# Patient Record
Sex: FEMALE | Race: WHITE | Employment: FULL TIME | URBAN - METROPOLITAN AREA
[De-identification: names, ages, dates, MRNs, and addresses within clinical notes are randomized per-mention and may not be internally consistent; named-entity substitution may affect disease eponyms.]

---

## 2019-10-14 ENCOUNTER — HOSPITAL ENCOUNTER (EMERGENCY)
Age: 34
Discharge: HOME | End: 2019-10-15
Attending: EMERGENCY MEDICINE | Admitting: EMERGENCY MEDICINE
Payer: COMMERCIAL

## 2019-10-14 ENCOUNTER — APPOINTMENT (OUTPATIENT)
Dept: CT IMAGING | Age: 34
End: 2019-10-14
Attending: PHYSICIAN ASSISTANT
Payer: COMMERCIAL

## 2019-10-14 DIAGNOSIS — S09.90XA CLOSED HEAD INJURY, INITIAL ENCOUNTER: ICD-10-CM

## 2019-10-14 DIAGNOSIS — R10.9 ABDOMINAL PAIN, UNSPECIFIED ABDOMINAL LOCATION: ICD-10-CM
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
## 2019-10-14 LAB
ALBUMIN SERPL-MCNC: 4.1 G/DL (ref 3.5–5)
ALBUMIN/GLOB SERPL: 1.1 {RATIO} (ref 1.1–2.2)
ALP SERPL-CCNC: 72 U/L (ref 45–117)
ALT SERPL-CCNC: 38 U/L (ref 12–78)
ANION GAP SERPL CALC-SCNC: 10 MMOL/L (ref 5–15)
AST SERPL-CCNC: 13 U/L (ref 15–37)
BASOPHILS # BLD: 0.1 K/UL (ref 0–0.1)
BASOPHILS NFR BLD: 0 % (ref 0–1)
BILIRUB SERPL-MCNC: 0.5 MG/DL (ref 0.2–1)
BUN SERPL-MCNC: 6 MG/DL (ref 6–20)
BUN/CREAT SERPL: 7 (ref 12–20)
CALCIUM SERPL-MCNC: 9.1 MG/DL (ref 8.5–10.1)
CHLORIDE SERPL-SCNC: 109 MMOL/L (ref 97–108)
CO2 SERPL-SCNC: 23 MMOL/L (ref 21–32)
CREAT SERPL-MCNC: 0.88 MG/DL (ref 0.55–1.02)
DIFFERENTIAL METHOD BLD: ABNORMAL
EOSINOPHIL # BLD: 0.1 K/UL (ref 0–0.4)
EOSINOPHIL NFR BLD: 0 % (ref 0–7)
ERYTHROCYTE [DISTWIDTH] IN BLOOD BY AUTOMATED COUNT: 12.6 % (ref 11.5–14.5)
GLOBULIN SER CALC-MCNC: 3.7 G/DL (ref 2–4)
GLUCOSE SERPL-MCNC: 132 MG/DL (ref 65–100)
HCG UR QL: NEGATIVE
HCT VFR BLD AUTO: 44.1 % (ref 35–47)
HGB BLD-MCNC: 14.9 G/DL (ref 11.5–16)
IMM GRANULOCYTES # BLD AUTO: 0.1 K/UL (ref 0–0.04)
IMM GRANULOCYTES NFR BLD AUTO: 1 % (ref 0–0.5)
LIPASE SERPL-CCNC: 285 U/L (ref 73–393)
LYMPHOCYTES # BLD: 2.3 K/UL (ref 0.8–3.5)
LYMPHOCYTES NFR BLD: 13 % (ref 12–49)
MCH RBC QN AUTO: 30.7 PG (ref 26–34)
MCHC RBC AUTO-ENTMCNC: 33.8 G/DL (ref 30–36.5)
MCV RBC AUTO: 90.7 FL (ref 80–99)
MONOCYTES # BLD: 1.1 K/UL (ref 0–1)
MONOCYTES NFR BLD: 6 % (ref 5–13)
NEUTS SEG # BLD: 14 K/UL (ref 1.8–8)
NEUTS SEG NFR BLD: 80 % (ref 32–75)
NRBC # BLD: 0 K/UL (ref 0–0.01)
NRBC BLD-RTO: 0 PER 100 WBC
PLATELET # BLD AUTO: 219 K/UL (ref 150–400)
PMV BLD AUTO: 10.8 FL (ref 8.9–12.9)
POTASSIUM SERPL-SCNC: 3.4 MMOL/L (ref 3.5–5.1)
PROT SERPL-MCNC: 7.8 G/DL (ref 6.4–8.2)
RBC # BLD AUTO: 4.86 M/UL (ref 3.8–5.2)
SODIUM SERPL-SCNC: 142 MMOL/L (ref 136–145)
WBC # BLD AUTO: 17.6 K/UL (ref 3.6–11)

## 2019-10-14 PROCEDURE — 74011636320 HC RX REV CODE- 636/320: Performed by: RADIOLOGY

## 2019-10-14 PROCEDURE — 99284 EMERGENCY DEPT VISIT MOD MDM: CPT

## 2019-10-14 PROCEDURE — 83690 ASSAY OF LIPASE: CPT

## 2019-10-14 PROCEDURE — 85025 COMPLETE CBC W/AUTO DIFF WBC: CPT

## 2019-10-14 PROCEDURE — 80053 COMPREHEN METABOLIC PANEL: CPT

## 2019-10-14 PROCEDURE — 81025 URINE PREGNANCY TEST: CPT

## 2019-10-14 PROCEDURE — 74177 CT ABD & PELVIS W/CONTRAST: CPT

## 2019-10-14 PROCEDURE — 72125 CT NECK SPINE W/O DYE: CPT

## 2019-10-14 PROCEDURE — 70450 CT HEAD/BRAIN W/O DYE: CPT

## 2019-10-14 PROCEDURE — 96360 HYDRATION IV INFUSION INIT: CPT

## 2019-10-14 PROCEDURE — 36415 COLL VENOUS BLD VENIPUNCTURE: CPT

## 2019-10-14 PROCEDURE — 74011000258 HC RX REV CODE- 258: Performed by: RADIOLOGY

## 2019-10-14 RX ORDER — NAPROXEN 500 MG/1
500 TABLET ORAL 2 TIMES DAILY WITH MEALS
Qty: 20 TAB | Refills: 0 | Status: SHIPPED | OUTPATIENT
Start: 2019-10-14 | End: 2019-10-24

## 2019-10-14 RX ORDER — CYCLOBENZAPRINE HCL 10 MG
10 TABLET ORAL
Qty: 10 TAB | Refills: 0 | Status: SHIPPED | OUTPATIENT
Start: 2019-10-14

## 2019-10-14 RX ORDER — SODIUM CHLORIDE 0.9 % (FLUSH) 0.9 %
10 SYRINGE (ML) INJECTION
Status: COMPLETED | OUTPATIENT
Start: 2019-10-14 | End: 2019-10-14

## 2019-10-14 RX ADMIN — SODIUM CHLORIDE 100 ML: 900 INJECTION, SOLUTION INTRAVENOUS at 22:52

## 2019-10-14 RX ADMIN — IOPAMIDOL 100 ML: 755 INJECTION, SOLUTION INTRAVENOUS at 22:52

## 2019-10-14 RX ADMIN — Medication 10 ML: at 22:52

## 2019-10-14 NOTE — LETTER
[address] 
[address] [facility] [address] [id number] 
[phone] Work/School Note Date: 10/14/2019 To Whom It May concern: 
 
Guanakito Means was seen and treated today in the emergency room by the following provider(s): 
Attending Provider: Teddy Gaines MD 
Physician Assistant: AL Pulliam. Guanakito Means may return to work on 10/17/19.  
 
Sincerely, 
 
 
 
 
Imelda Lopez PA-C

## 2019-10-15 VITALS
RESPIRATION RATE: 18 BRPM | SYSTOLIC BLOOD PRESSURE: 130 MMHG | TEMPERATURE: 98 F | OXYGEN SATURATION: 98 % | HEART RATE: 80 BPM | DIASTOLIC BLOOD PRESSURE: 93 MMHG
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
## 2019-10-15 NOTE — ED NOTES
Discharge paperwork given by provider, Ambulated out of the department with a steady gait in no acute distress.

## 2019-10-15 NOTE — ED NOTES
Pt received in sign out pending CT. CT negative. Pt feeling better. Right arm discomfort improving. Patient's results have been reviewed with them. Patient and/or family have verbally conveyed their understanding and agreement of the patient's signs, symptoms, diagnosis, treatment and prognosis and additionally agree to follow up as recommended or return to the Emergency Room should their condition change prior to follow-up. Discharge instructions have also been provided to the patient with some educational information regarding their diagnosis as well a list of reasons why they would want to return to the ER prior to their follow-up appointment should their condition change.
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
## 2021-05-30 ENCOUNTER — APPOINTMENT (OUTPATIENT)
Dept: CT IMAGING | Age: 36
End: 2021-05-30
Attending: PHYSICIAN ASSISTANT
Payer: COMMERCIAL

## 2021-05-30 ENCOUNTER — APPOINTMENT (OUTPATIENT)
Dept: ULTRASOUND IMAGING | Age: 36
End: 2021-05-30
Attending: PHYSICIAN ASSISTANT
Payer: COMMERCIAL

## 2021-05-30 ENCOUNTER — HOSPITAL ENCOUNTER (EMERGENCY)
Age: 36
Discharge: HOME | End: 2021-05-30
Attending: EMERGENCY MEDICINE
Payer: COMMERCIAL

## 2021-05-30 VITALS
HEIGHT: 68 IN | TEMPERATURE: 98.4 F | DIASTOLIC BLOOD PRESSURE: 80 MMHG | RESPIRATION RATE: 16 BRPM | OXYGEN SATURATION: 98 % | HEART RATE: 90 BPM | SYSTOLIC BLOOD PRESSURE: 136 MMHG | BODY MASS INDEX: 37.49 KG/M2 | WEIGHT: 247.36 LBS

## 2021-05-30 DIAGNOSIS — K57.92 DIVERTICULITIS: Primary | ICD-10-CM

## 2021-05-30 LAB
ALBUMIN SERPL-MCNC: 3.8 G/DL (ref 3.5–5)
ALBUMIN/GLOB SERPL: 0.9 {RATIO} (ref 1.1–2.2)
ALP SERPL-CCNC: 86 U/L (ref 45–117)
ALT SERPL-CCNC: 27 U/L (ref 12–78)
ANION GAP SERPL CALC-SCNC: 7 MMOL/L (ref 5–15)
APPEARANCE UR: CLEAR
AST SERPL-CCNC: 9 U/L (ref 15–37)
BACTERIA URNS QL MICRO: ABNORMAL /HPF
BASOPHILS # BLD: 0.1 K/UL (ref 0–0.1)
BASOPHILS NFR BLD: 0 % (ref 0–1)
BILIRUB SERPL-MCNC: 1 MG/DL (ref 0.2–1)
BILIRUB UR QL: NEGATIVE
BUN SERPL-MCNC: 6 MG/DL (ref 6–20)
BUN/CREAT SERPL: 9 (ref 12–20)
CALCIUM SERPL-MCNC: 8.9 MG/DL (ref 8.5–10.1)
CHLORIDE SERPL-SCNC: 106 MMOL/L (ref 97–108)
CO2 SERPL-SCNC: 26 MMOL/L (ref 21–32)
COLOR UR: ABNORMAL
COMMENT, HOLDF: NORMAL
CREAT SERPL-MCNC: 0.68 MG/DL (ref 0.55–1.02)
DIFFERENTIAL METHOD BLD: ABNORMAL
EOSINOPHIL # BLD: 0.6 K/UL (ref 0–0.4)
EOSINOPHIL NFR BLD: 3 % (ref 0–7)
EPITH CASTS URNS QL MICRO: ABNORMAL /LPF
ERYTHROCYTE [DISTWIDTH] IN BLOOD BY AUTOMATED COUNT: 12.9 % (ref 11.5–14.5)
GLOBULIN SER CALC-MCNC: 4.4 G/DL (ref 2–4)
GLUCOSE SERPL-MCNC: 103 MG/DL (ref 65–100)
GLUCOSE UR STRIP.AUTO-MCNC: NEGATIVE MG/DL
HCG UR QL: NEGATIVE
HCT VFR BLD AUTO: 46.1 % (ref 35–47)
HGB BLD-MCNC: 15.3 G/DL (ref 11.5–16)
HGB UR QL STRIP: NEGATIVE
HYALINE CASTS URNS QL MICRO: ABNORMAL /LPF (ref 0–5)
IMM GRANULOCYTES # BLD AUTO: 0.1 K/UL (ref 0–0.04)
IMM GRANULOCYTES NFR BLD AUTO: 1 % (ref 0–0.5)
KETONES UR QL STRIP.AUTO: 15 MG/DL
LACTATE SERPL-SCNC: 1 MMOL/L (ref 0.4–2)
LEUKOCYTE ESTERASE UR QL STRIP.AUTO: NEGATIVE
LYMPHOCYTES # BLD: 1.4 K/UL (ref 0.8–3.5)
LYMPHOCYTES NFR BLD: 6 % (ref 12–49)
MCH RBC QN AUTO: 29.7 PG (ref 26–34)
MCHC RBC AUTO-ENTMCNC: 33.2 G/DL (ref 30–36.5)
MCV RBC AUTO: 89.5 FL (ref 80–99)
MONOCYTES # BLD: 1.3 K/UL (ref 0–1)
MONOCYTES NFR BLD: 6 % (ref 5–13)
NEUTS SEG # BLD: 18.1 K/UL (ref 1.8–8)
NEUTS SEG NFR BLD: 84 % (ref 32–75)
NITRITE UR QL STRIP.AUTO: NEGATIVE
NRBC # BLD: 0 K/UL (ref 0–0.01)
NRBC BLD-RTO: 0 PER 100 WBC
PH UR STRIP: 5.5 [PH] (ref 5–8)
PLATELET # BLD AUTO: 229 K/UL (ref 150–400)
PMV BLD AUTO: 10.9 FL (ref 8.9–12.9)
POTASSIUM SERPL-SCNC: 4 MMOL/L (ref 3.5–5.1)
PROT SERPL-MCNC: 8.2 G/DL (ref 6.4–8.2)
PROT UR STRIP-MCNC: NEGATIVE MG/DL
RBC # BLD AUTO: 5.15 M/UL (ref 3.8–5.2)
RBC #/AREA URNS HPF: ABNORMAL /HPF (ref 0–5)
SAMPLES BEING HELD,HOLD: NORMAL
SODIUM SERPL-SCNC: 139 MMOL/L (ref 136–145)
SP GR UR REFRACTOMETRY: 1.01 (ref 1–1.03)
UR CULT HOLD, URHOLD: NORMAL
UROBILINOGEN UR QL STRIP.AUTO: 0.2 EU/DL (ref 0.2–1)
WBC # BLD AUTO: 21.5 K/UL (ref 3.6–11)
WBC URNS QL MICRO: ABNORMAL /HPF (ref 0–4)

## 2021-05-30 PROCEDURE — 81001 URINALYSIS AUTO W/SCOPE: CPT

## 2021-05-30 PROCEDURE — 87040 BLOOD CULTURE FOR BACTERIA: CPT

## 2021-05-30 PROCEDURE — 85025 COMPLETE CBC W/AUTO DIFF WBC: CPT

## 2021-05-30 PROCEDURE — 81025 URINE PREGNANCY TEST: CPT

## 2021-05-30 PROCEDURE — 74011000636 HC RX REV CODE- 636: Performed by: RADIOLOGY

## 2021-05-30 PROCEDURE — 76830 TRANSVAGINAL US NON-OB: CPT

## 2021-05-30 PROCEDURE — 76856 US EXAM PELVIC COMPLETE: CPT

## 2021-05-30 PROCEDURE — 80053 COMPREHEN METABOLIC PANEL: CPT

## 2021-05-30 PROCEDURE — 99284 EMERGENCY DEPT VISIT MOD MDM: CPT

## 2021-05-30 PROCEDURE — 83605 ASSAY OF LACTIC ACID: CPT

## 2021-05-30 PROCEDURE — 74177 CT ABD & PELVIS W/CONTRAST: CPT

## 2021-05-30 PROCEDURE — 74011250636 HC RX REV CODE- 250/636: Performed by: PHYSICIAN ASSISTANT

## 2021-05-30 PROCEDURE — 96361 HYDRATE IV INFUSION ADD-ON: CPT

## 2021-05-30 PROCEDURE — 74011250637 HC RX REV CODE- 250/637: Performed by: PHYSICIAN ASSISTANT

## 2021-05-30 PROCEDURE — 36415 COLL VENOUS BLD VENIPUNCTURE: CPT

## 2021-05-30 PROCEDURE — 96360 HYDRATION IV INFUSION INIT: CPT

## 2021-05-30 RX ORDER — ACETAMINOPHEN 325 MG/1
650 TABLET ORAL
Status: COMPLETED | OUTPATIENT
Start: 2021-05-30 | End: 2021-05-30

## 2021-05-30 RX ORDER — AMOXICILLIN AND CLAVULANATE POTASSIUM 875; 125 MG/1; MG/1
1 TABLET, FILM COATED ORAL 2 TIMES DAILY
Qty: 20 TABLET | Refills: 0 | Status: SHIPPED | OUTPATIENT
Start: 2021-05-30 | End: 2021-05-30 | Stop reason: SDUPTHER

## 2021-05-30 RX ORDER — AMOXICILLIN AND CLAVULANATE POTASSIUM 875; 125 MG/1; MG/1
1 TABLET, FILM COATED ORAL 2 TIMES DAILY
Qty: 20 TABLET | Refills: 0 | Status: SHIPPED | OUTPATIENT
Start: 2021-05-30 | End: 2021-06-09

## 2021-05-30 RX ADMIN — SODIUM CHLORIDE 1000 ML: 9 INJECTION, SOLUTION INTRAVENOUS at 16:51

## 2021-05-30 RX ADMIN — IOPAMIDOL 100 ML: 755 INJECTION, SOLUTION INTRAVENOUS at 18:35

## 2021-05-30 RX ADMIN — ACETAMINOPHEN 650 MG: 325 TABLET ORAL at 16:51

## 2021-05-30 NOTE — DISCHARGE INSTRUCTIONS
Please treat discomfort with Tylenol/Motrin as we discussed. Please follow up closely with your PCP. Please eat bland foods. Avoid fiber in the acute phase and avoid dairy or spicy foods until you start feeling better, then you can slowly add these back in.

## 2021-05-30 NOTE — ED TRIAGE NOTES
Pt arrives ambulatory to triage for c/o lower abdominal and pelvic pain. Mild fevers, 1 episode vomiting, cramping.  [facility] for further eval.

## 2021-05-30 NOTE — ED PROVIDER NOTES
Tammie Green is a 38 y.o. female without major PMhx who presents to ED with cc of 6/10 lower abdominal pain x 2 days. Has had some nausea with 1 episode of vomiting at initial onset, but no vomiting since. LBM: small, this AM, denies blood. Endorsed chills last night. Endorses some urinary frequency, denies hematuria. Denies vaginal bleeding or abnormal discharge. Is not sexually active, last interaction 1.5 years ago. Has been taking ibuprofen: 2 every 4-6 hrs. Last dose around 9:30 AM.    Juliana Peraza to [facility], referred to ED. Pt denies additional symptoms of cough, congestion, CP, SOB, syncope, visual changes, neck pain, wound. PMHx: Pt denies. PSHx: Appendectomy     PCP: Unknown (Inactive)    There are no other complaints verbalized at this time. History reviewed. No pertinent past medical history. Past Surgical History:   Procedure Laterality Date    HX APPENDECTOMY           History reviewed. No pertinent family history. Social History     Socioeconomic History    Marital status: SINGLE     Spouse name: Not on file    Number of children: Not on file    Years of education: Not on file    Highest education level: Not on file   Occupational History    Not on file   Tobacco Use    Smoking status: Current Every Day Smoker     Packs/day: 0.25    Smokeless tobacco: Never Used   Substance and Sexual Activity    Alcohol use: Not Currently     Comment: occasional    Drug use: No    Sexual activity: Not on file   Other Topics Concern    Not on file   Social History Narrative    Not on file     Social Determinants of Health     Financial Resource Strain:     Difficulty of Paying Living Expenses:    Food Insecurity:     Worried About Running Out of Food in the Last Year:     [address] in the Last Year:    Transportation Needs:     Lack of Transportation (Medical):      Lack of Transportation (Non-Medical):    Physical Activity:     Days of Exercise per Week:     Minutes of Exercise per Session:    Stress:     Feeling of Stress :    Social Connections:     Frequency of Communication with Friends and Family:     Frequency of Social Gatherings with Friends and Family:     Attends [employer] Services:     Active Member of Clubs or Organizations:     Attends Club or Organization Meetings:     Marital Status:    Intimate Partner Violence:     Fear of Current or Ex-Partner:     Emotionally Abused:     Physically Abused:     Sexually Abused: ALLERGIES: Patient has no known allergies. Review of Systems   Constitutional: Positive for chills. Negative for fever. HENT: Negative for congestion. Eyes: Negative for visual disturbance. Respiratory: Negative for cough and shortness of breath. Cardiovascular: Negative for chest pain. Gastrointestinal: Positive for abdominal pain, nausea and vomiting. Negative for constipation and diarrhea. Genitourinary: Positive for frequency. Negative for dysuria, vaginal bleeding and vaginal discharge. Musculoskeletal: Negative for neck pain. Skin: Negative for wound. Neurological: Negative for syncope. All other systems reviewed and are negative. Vitals:    05/30/21 1529 05/30/21 1910 05/30/21 1952   BP: (!) 141/89 112/75 136/80   Pulse: (!) 118 (!) 103 90   Resp: 20 18 16   Temp: (!) 101 °F (38.3 °C) 98.9 °F (37.2 °C) 98.4 °F (36.9 °C)   SpO2: 97% 98% 98%   Weight: 112.2 kg (247 lb 5.7 oz)     Height: 5' 8\" (1.727 m)              Physical Exam  Vitals and nursing note reviewed. Constitutional:       Appearance: Normal appearance. She is not toxic-appearing or diaphoretic. Comments: Febrile: 101 F   HENT:      Head: Atraumatic. Right Ear: External ear normal.      Left Ear: External ear normal.   Eyes:      Conjunctiva/sclera: Conjunctivae normal.   Cardiovascular:      Rate and Rhythm: Regular rhythm. Tachycardia present. Heart sounds: Normal heart sounds. No murmur heard.    No friction rub. No gallop. Pulmonary:      Effort: No respiratory distress. Breath sounds: Normal breath sounds. No stridor. No wheezing, rhonchi or rales. Abdominal:      General: Bowel sounds are normal. There is no distension. Palpations: Abdomen is soft. Tenderness: There is abdominal tenderness in the suprapubic area. There is no right CVA tenderness, left CVA tenderness, guarding or rebound. Hernia: No hernia is present. Musculoskeletal:         General: No swelling or deformity. Cervical back: Normal range of motion. No rigidity. Skin:     General: Skin is warm and dry. Neurological:      Mental Status: She is alert and oriented to person, place, and time. Mental status is at baseline. MDM  Number of Diagnoses or Management Options     Amount and/or Complexity of Data Reviewed  Clinical lab tests: ordered and reviewed  Tests in the radiology section of CPT®: ordered and reviewed  Discuss the patient with other providers: yes (Dr Waylon Dukes, ED attending)           Procedures        7:22 PM  Dr Waylon Dukes, ED attending, in to see and evaluate the patient with me. We have reviewed results with the patient who verbalized her understanding of findings. Continues to be non-toxic appearing, tachycardia improving after 1L fluids. States she does not wish for admission at this time and wishes to go home and treat as outpatient. Notes she does not live alone. We have discussed close return precautions and pathophysiology of diverticulitis and she verbalizes understanding. VITAL SIGNS:  Vitals:    05/30/21 1529 05/30/21 1910 05/30/21 1952   BP: (!) 141/89 112/75 136/80   Pulse: (!) 118 (!) 103 90   Resp: 20 18 16   Temp: (!) 101 °F (38.3 °C) 98.9 °F (37.2 °C) 98.4 °F (36.9 °C)   SpO2: 97% 98% 98%   Weight: 112.2 kg (247 lb 5.7 oz)     Height: 5' 8\" (1.727 m)           LABS:  See attached. IMAGING:  US PELV NON OBS   Final Result   Nonvisualization of the right ovary.  Transvaginal sonography will   be performed to better evaluate. TRANSVAGINAL TECHNIQUE: Transvaginal sonography was performed with multiple   static images of the uterus and ovaries obtained. FINDINGS:    UTERUS:   The uterus is normal. The uterus measures 7.3 x 4.2 x 3.4 cm. .       ENDOMETRIUM:   The endometrial stripe measures 1 mm. Elisha Palmer RIGHT OVARY:   The right ovary is normal. The right ovary measures 3.4 x 3.1 x 2.6 cm. There   is normal color flow to the right ovary. LEFT OVARY:   The left ovary is not visualized because of position. CUL-DE-SAC:   There is free fluid in the cul-de-sac. IMPRESSION: Normal uterus and ovaries. Small amount of free fluid in the   cul-de-sac. US TRANSVAGINAL   Final Result   Nonvisualization of the right ovary. Transvaginal sonography will   be performed to better evaluate. TRANSVAGINAL TECHNIQUE: Transvaginal sonography was performed with multiple   static images of the uterus and ovaries obtained. FINDINGS:    UTERUS:   The uterus is normal. The uterus measures 7.3 x 4.2 x 3.4 cm. .       ENDOMETRIUM:   The endometrial stripe measures 1 mm. Elisha Palmer RIGHT OVARY:   The right ovary is normal. The right ovary measures 3.4 x 3.1 x 2.6 cm. There   is normal color flow to the right ovary. LEFT OVARY:   The left ovary is not visualized because of position. CUL-DE-SAC:   There is free fluid in the cul-de-sac. IMPRESSION: Normal uterus and ovaries. Small amount of free fluid in the   cul-de-sac.             CT ABD PELV W CONT   Final Result   Acute sigmoid diverticulitis            Medications During Visit:  Medications   sodium chloride 0.9 % bolus infusion 1,000 mL (0 mL IntraVENous IV Completed 5/30/21 2003)   acetaminophen (TYLENOL) tablet 650 mg (650 mg Oral Given 5/30/21 1651)   iopamidoL (ISOVUE-370) 76 % injection 100 mL (100 mL IntraVENous Given 5/30/21 1835)         DECISION MAKING:    Dylan Morrell is a 38 y.o. female who comes in as above. Presents with lower abdominal pain over the past two days. Has had some nausea but has not had vomiting since first day. Physical exam demonstrative of a febrile female (101F) with suprapubic abdominal tenderness. UA negative for infection, POC negative for pregnancy. CBC and CMP obtained, significant for WBC 21.5. Given elevated temperature and tachycardia, lactic obtained, noted to be without elevation. Ultrasound demonstrating normal appearing uterus and good blood flow to bilateral ovaries without evidence of TOA. CT abdomen pelvis demonstrating sigmoid diverticulitis. Patient has been treated with Tylenol and 1 L of fluids with improvement of tachycardia and temperature. Upon re-examination, she continues to be well appearing and has had some improvement of discomfort. Attending in to see patient with me during her evaluation and we have discussed results with patient. Patient verbalizes wish for discharge an outpatient treatment of diverticulitis, will send course of Augmentin. We have discussed close return precautions as well as close follow up with primary care or GI. Opportunity for questions presented. Pt verbalizes their understanding and agreement with care plan. IMPRESSION:  1.  Diverticulitis        DISPOSITION:  Discharged      Discharge Medication List as of 5/30/2021  8:02 PM           Follow-up Information     Follow up With Specialties Details Why Contact Info    [address] DE[facility] Emergency Medicine Go to  As needed, If symptoms worsen, if worsening pain or symptoms, no improvement over the next 2-3 days, fever despite antipyretics, new or other concerning symptoms [address] [facility]  Schedule an appointment as soon as possible for a visit   [address] [facility] [address] [phone]    or PCP from the list provided to you  Schedule an appointment as soon as possible for a visit       [facility]  Schedule an appointment as soon as possible for a visit   [address]  [facility] [address] [facility] [address]            The patient is asked to follow-up with their primary care provider and any other physicians as above in the next several days. They are to call tomorrow for an appointment. We have discussed strict return precautions and the patient is asked to return promptly for any increased concerns or worsening of symptoms and for return precautions regarding their symptoms today. They can return to this emergency department or any other emergency department. I have discussed with them results as above and presented opportunity for questions. They verbalize their understanding of the aboveand agreement with care plan. Please note that this dictation was completed with [employer], the computer voice recognition software. Quite often unanticipated grammatical, syntax, homophones, and other interpretive errors are inadvertently transcribed by the computer software. Please disregard these errors. Please excuse any errors that have escaped final proofreading.

## 2021-05-31 NOTE — ED NOTES
MD  reviewed discharge instructions and options with patient. Patient verbalized understanding. RN reviewed discharge instructions using teach back method. Patient ambulatory to exit without difficulty and no acute signs of distress. No complaints or needs expressed at this time. Patient counseled on medications prescribed at discharge. Vital signs stable. Patient to follow up with PCP and GI in the morning for appointment.

## 2021-06-04 LAB
BACTERIA SPEC CULT: NORMAL
SERVICE CMNT-IMP: NORMAL

## 2024-06-26 LAB
ABO, EXTERNAL RESULT: NORMAL
C. TRACHOMATIS, EXTERNAL RESULT: NEGATIVE
HEP B, EXTERNAL RESULT: NEGATIVE
HEPATITIS C ANTIBODY, EXTERNAL RESULT: NON REACTIVE
HIV, EXTERNAL RESULT: NEGATIVE
N. GONORRHOEAE, EXTERNAL RESULT: NEGATIVE
RUBELLA TITER, EXTERNAL RESULT: NORMAL
T. PALLIDUM (SYPHILIS) ANTIBODY, EXTERNAL RESULT: NON REACTIVE

## 2024-11-06 ENCOUNTER — TELEPHONE (OUTPATIENT)
Age: 39
End: 2024-11-06

## 2024-11-06 NOTE — TELEPHONE ENCOUNTER
Spoke with patient and confirmed name and      Scheduled appointment for 24 @ 1:45 [facility] ( provided location)     Advised patient to arrive 10 min early to fill out paperwork.     Informed patient that she may bring two guest over the age of 12.

## 2024-11-07 ENCOUNTER — ROUTINE PRENATAL (OUTPATIENT)
Age: 39
End: 2024-11-07

## 2024-11-07 VITALS
BODY MASS INDEX: 36.53 KG/M2 | DIASTOLIC BLOOD PRESSURE: 84 MMHG | SYSTOLIC BLOOD PRESSURE: 125 MMHG | HEIGHT: 69 IN | HEART RATE: 91 BPM

## 2024-11-07 DIAGNOSIS — Z3A.30 30 WEEKS GESTATION OF PREGNANCY: Primary | ICD-10-CM

## 2024-11-07 PROCEDURE — 99204 OFFICE O/P NEW MOD 45 MIN: CPT | Performed by: STUDENT IN AN ORGANIZED HEALTH CARE EDUCATION/TRAINING PROGRAM

## 2024-11-07 PROCEDURE — 76811 OB US DETAILED SNGL FETUS: CPT | Performed by: STUDENT IN AN ORGANIZED HEALTH CARE EDUCATION/TRAINING PROGRAM

## 2024-11-07 RX ORDER — ASPIRIN 81 MG/1
81 TABLET ORAL DAILY
COMMUNITY

## 2024-11-07 RX ORDER — ESCITALOPRAM OXALATE 10 MG/1
10 TABLET ORAL DAILY
COMMUNITY

## 2024-11-07 NOTE — PROCEDURES
PATIENT: DEB BROWN   -  : 1985   -  DOS:2024   -  INTERPRETING PROVIDER:Asiha Benjamin,   Indication  ========    newly diagnosed GDM    Method  ======    Transabdominal ultrasound examination. View: Suboptimal view: limited by late gestational age    Dating  ======    LMP on: 4/10/2024  Cycle: regular cycle  GA by LMP 30 w + 1 d  CAROLANN by LMP: 1/15/2025  Ultrasound examination on: 2024  GA by U/S based upon: AC, BPD, Femur, HC  GA by U/S 31 w + 3 d  CAROLANN by U/S: 2025  Assigned: based on the LMP, selected on 2024  Assigned GA 30 w + 1 d  Assigned CAROLANN: 1/15/2025    Fetal Growth Overview  =================    Exam date        GA              BPD (mm)        HC (mm)              AC (mm)               FL (mm)             HL (mm)            EFW (g)  2024          30w 1d        79    83%          288.8    62%        271.6    77%         59.1    55%        53.1    73%         1720    74%    Fetal Biometry  ============    Standard  BPD 79.0 mm 31w 5d 83% Hadlock  .2 mm 33w 1d 98% Jermaine  .8 mm 31w 5d 62% Hadlock  Cerebellum tr 36.4 mm 30w 1d 33% Hill  .6 mm 31w 2d 77% Hadlock  Femur 59.1 mm 30w 6d 55% Hadlock  Humerus 53.1 mm 30w 6d 73% Jermaine  EFW 1,720 g 30w 6d 74% Hadlock  EFW (lb) 3 lb  EFW (oz) 13 oz  EFW by: Hadlock (BPD-HC-AC-FL)  Extended   7.0 mm  CM 7.4 mm  60% Nicolaides  Head / Face / Neck  Nasal bone: NOT VISUALIZED  Other Structures   bpm    General Evaluation  ==============    Cardiac activity present.  bpm. Fetal movements: visualized. Presentation: BREECH  Placenta: Placental site: Anterior  Umbilical cord: Cord vessels: 3 vessel cord. Insertion site: central  Amniotic fluid: Amount of AF: normal. MVP 5.3 cm    Fetal Anatomy  ===========    Cranium: normal  Lateral ventricles: normal  Choroid plexus: normal  Midline falx: normal  Cavum septi pellucidi: normal  Cerebellum: normal  Cisterna magna: normal  Head /

## 2024-11-07 NOTE — PROGRESS NOTES
Patient was seen 11/7/2024      Please look under media to view full consult and ultrasound report in ViewPoint.         Aisha Benjamin MD   Maternal Fetal Medicine

## 2024-11-07 NOTE — PROGRESS NOTES
Patient has been newly diagnosed with Gestational Diabetes (GDM). During office visit today, verbal and written teaching were provided to the patient on the following: Gestational Diabetes (GDM), nutrition and carbohydrate choices, how to self-administer finger sticks, blood glucose goals, logging blood sugars, and signs and symptoms of hypoglycemia and hyperglycemia. Patient instructed to check blood sugars 4 times a day: fasting and 1-2 hours postprandial.     Patient to send blood sugar logs weekly on Wednesdays or bring into the office the week of an appointment. [employer] thread was started today for patient to send in log starting next week.     Patient aware that a referral to Diabetes Education was sent and that their office will be in contact to schedule an appointment. Requested patient to keep a food journal to review during appointment. Patient instructed to call our office if they haven't heard from Diabetic Ed within one business week.     Prescriptions sent to patient's preferred pharmacy for glucometer, lancets, and strips. Patient verbalized understanding of the all the above. All questions were answered.

## 2024-11-08 ENCOUNTER — PATIENT MESSAGE (OUTPATIENT)
Age: 39
End: 2024-11-08

## 2024-11-08 DIAGNOSIS — O24.419 GESTATIONAL DIABETES MELLITUS (GDM), ANTEPARTUM, GESTATIONAL DIABETES METHOD OF CONTROL UNSPECIFIED: Primary | ICD-10-CM

## 2024-11-08 RX ORDER — BLOOD SUGAR DIAGNOSTIC
1 STRIP MISCELLANEOUS 4 TIMES DAILY
Qty: 150 EACH | Refills: 0 | Status: SHIPPED | OUTPATIENT
Start: 2024-11-08

## 2024-12-06 ENCOUNTER — HOSPITAL ENCOUNTER (EMERGENCY)
Facility: HOSPITAL | Age: 39
Discharge: HOME | End: 2024-12-06
Attending: OBSTETRICS & GYNECOLOGY
Payer: COMMERCIAL

## 2024-12-06 VITALS
RESPIRATION RATE: 15 BRPM | SYSTOLIC BLOOD PRESSURE: 127 MMHG | TEMPERATURE: 98.6 F | HEART RATE: 93 BPM | DIASTOLIC BLOOD PRESSURE: 89 MMHG

## 2024-12-06 LAB
ALBUMIN SERPL-MCNC: 2.9 G/DL (ref 3.5–5)
ALBUMIN/GLOB SERPL: 0.8 (ref 1.1–2.2)
ALP SERPL-CCNC: 136 U/L (ref 45–117)
ALT SERPL-CCNC: 73 U/L (ref 12–78)
ANION GAP SERPL CALC-SCNC: 9 MMOL/L (ref 2–12)
AST SERPL-CCNC: 32 U/L (ref 15–37)
BILIRUB SERPL-MCNC: 0.5 MG/DL (ref 0.2–1)
BUN SERPL-MCNC: 9 MG/DL (ref 6–20)
BUN/CREAT SERPL: 19 (ref 12–20)
CALCIUM SERPL-MCNC: 9.7 MG/DL (ref 8.5–10.1)
CHLORIDE SERPL-SCNC: 109 MMOL/L (ref 97–108)
CO2 SERPL-SCNC: 18 MMOL/L (ref 21–32)
CREAT SERPL-MCNC: 0.47 MG/DL (ref 0.55–1.02)
CREAT UR-MCNC: 39.7 MG/DL
ERYTHROCYTE [DISTWIDTH] IN BLOOD BY AUTOMATED COUNT: 14.3 % (ref 11.5–14.5)
GLOBULIN SER CALC-MCNC: 3.6 G/DL (ref 2–4)
GLUCOSE SERPL-MCNC: 85 MG/DL (ref 65–100)
HCT VFR BLD AUTO: 39.1 % (ref 35–47)
HGB BLD-MCNC: 13.2 G/DL (ref 11.5–16)
MCH RBC QN AUTO: 29.5 PG (ref 26–34)
MCHC RBC AUTO-ENTMCNC: 33.8 G/DL (ref 30–36.5)
MCV RBC AUTO: 87.5 FL (ref 80–99)
NRBC # BLD: 0 K/UL (ref 0–0.01)
NRBC BLD-RTO: 0 PER 100 WBC
PLATELET # BLD AUTO: 205 K/UL (ref 150–400)
PMV BLD AUTO: 11.3 FL (ref 8.9–12.9)
POTASSIUM SERPL-SCNC: 4.3 MMOL/L (ref 3.5–5.1)
PROT SERPL-MCNC: 6.5 G/DL (ref 6.4–8.2)
PROT UR-MCNC: 16 MG/DL (ref 0–11.9)
PROT/CREAT UR-RTO: 0.4
RBC # BLD AUTO: 4.47 M/UL (ref 3.8–5.2)
SODIUM SERPL-SCNC: 136 MMOL/L (ref 136–145)
WBC # BLD AUTO: 14.7 K/UL (ref 3.6–11)

## 2024-12-06 PROCEDURE — 84156 ASSAY OF PROTEIN URINE: CPT

## 2024-12-06 PROCEDURE — 4500000002 HC ER NO CHARGE

## 2024-12-06 PROCEDURE — 82570 ASSAY OF URINE CREATININE: CPT

## 2024-12-06 PROCEDURE — 99284 EMERGENCY DEPT VISIT MOD MDM: CPT

## 2024-12-06 PROCEDURE — 85027 COMPLETE CBC AUTOMATED: CPT

## 2024-12-06 PROCEDURE — 36415 COLL VENOUS BLD VENIPUNCTURE: CPT

## 2024-12-06 PROCEDURE — 80053 COMPREHEN METABOLIC PANEL: CPT

## 2024-12-06 RX ORDER — NIFEDIPINE 30 MG/1
90 TABLET, EXTENDED RELEASE ORAL DAILY
Status: DISCONTINUED | OUTPATIENT
Start: 2024-12-06 | End: 2024-12-06

## 2024-12-06 NOTE — H&P
History & Physical    Name: Jennifer Nassar MRN: [id number]  SSN: [id number]    YOB: 1985  Age: 38 y.o.  Sex: female      Subjective:     Chief Complaint:  Pregnancy and elevated blood pressures    History of Present Illness: Ms. Nassar is a 38 y.o. G1 at 34w2d  female with an estimated gestational age of 34w2d with Estimated Date of Delivery: 1/15/25.     She was sent to L&D for evaluation for elevated blood pressures.   She first had elevated blood pressures yesterday in the office with /90.   This morning she took her BP which was elevated 150s/90s.   Labs sent yesterday returned : Cr 0.62, AST 31, ALT 52, Hgb 14.2, Plts 220. P/C ratio pending.     She denies headaches, vision changes, RUQ pain or BLE edema.    She has no contractions, LOF, VB, VD, dysuria. She has great FM.      Pregnancy problem:   - Rh neg- s/p rhogam  - AMA, mat 21 neg  - BMI 36  - GDMA1- diet controlled, growth 74%ile      OB History    Para Term  AB Living   1             SAB IAB Ectopic Molar Multiple Live Births                    # Outcome Date GA Lbr Devin/2nd Weight Sex Type Anes PTL Lv   1 Current              No past medical history on file.  Past Surgical History:   Procedure Laterality Date    APPENDECTOMY       Social History     Occupational History    Not on file   Tobacco Use    Smoking status: Every Day     Current packs/day: 0.25     Types: Cigarettes    Smokeless tobacco: Never   Substance and Sexual Activity    Alcohol use: Not Currently    Drug use: No    Sexual activity: Not on file      No family history on file.    No Known Allergies  Prior to Admission medications    Medication Sig Start Date End Date Taking? Authorizing Provider   blood glucose test strips (EXACTECH TEST) strip 1 each by In Vitro route 4 times daily As needed. 24   Suly Everett MD   escitalopram (LEXAPRO) 10 MG tablet Take 1 tablet by mouth daily    Provider, MD Donna   aspirin 81 MG EC tablet  Take 1 tablet by mouth daily    ProviderDonna MD   Prenatal Vit w/Zd-Ibffdulss-UB (PNV PO) Take by mouth    Provider, MD Donna        Review of Systems:  A comprehensive review of systems was negative except for that written in the History of Present Illness.     Objective:     Vitals:    Vitals:    24 1546 24 1554 24 1559 24 1605   BP: 131/85 138/82 138/82 (!) 140/82   Pulse: 90 86 86 93   Resp:   15    Temp:   98.6 °F (37 °C)    TempSrc:   Oral       Temp (24hrs), Av.6 °F (37 °C), Min:98.6 °F (37 °C), Max:98.6 °F (37 °C)    BP  Min: 131/85  Max: 140/82      BP (!) 140/82   Pulse 93   Temp 98.6 °F (37 °C) (Oral)   Resp 15   LMP 04/10/2024     NST:  An NST was performed and was reactive. The baseline FHR was 135, Moderate baseline  variability was noted. Accelerations of sufficient amplitude and duration were noted.  There were no decelerations noted.    Gen: alert and oriented X3   Resp:nonlabored respirations  Cardiac: normal peripheral perfusion  Abdomen: soft, nontender, nondistended. Gravid  Ext: no pitting edema  Cephalic presentation on US    Cat 1 fetal tracing  No contractions    Assessment and Plan:       39yo G1 at 34w2d presents to L&D for PIH evaluation.     PIH: elevated BP yesterday and again today, only slightly mild range  - Dx GHTN, will at the minimum proceed with weekly ANS, weekly labs and strict BP monitoring  - PIH labs yesterday: Cr 0.62, AST 31, ALT 52, Hgb 14.2, Plts 220. P/C ratio pending.   - Serial Bps, Repeat PIH labs  - Hold on BMZ given GDM    Other pregnancy problem:   - Rh neg- s/p rhogam  - AMA, mat 21 neg  - BMI 36  - GDMA1- diet controlled, growth 74%ile  - FOB not involved    Dispo: Labs, serial BP monitoring. If severe PreE, will proceed with induction. If severe features ruled out, will see in office Monday/Tuesday with BPP.     Natalie Zavala MD      5:07 PM  Cephalic presentation.   Reviewed options with patient and all

## 2024-12-06 NOTE — PROGRESS NOTES
1540 Pt presents to [facility] with c/o htn this AM. Pt denies being on any antihypertensives. Occ HA but denies blurry vision, swelling, or epigastric pain.    1600 Dr Zavala at bedside. Labs ordered.    1700 Dr Zavala at bedside. Discussing POC with pt. Plan for either DC Home if labs are normal or IOL if severe features present. Vscan done, cephalic. Signout given to Dr Travis.    1815 All labs and BP reviewed with Dr Travis. Orders given to DC pt home. Pt given BP cuff for home monitoring.

## 2024-12-06 NOTE — DISCHARGE INSTRUCTIONS
than your doctor told you it should be, or it rises quickly.     You have any vaginal bleeding.     You have new nausea or vomiting.     You think that you are in labor.     You have pain in your belly or pelvis.     You gain weight rapidly.   Follow-up care is a key part of your treatment and safety. Be sure to make and go to all appointments, and call your doctor if you are having problems. It's also a good idea to know your test results and keep a list of the medicines you take.  Where can you learn more?  Go to https://www.Rewind Me.net/patientEd and enter Z954 to learn more about \"Preeclampsia: Care Instructions.\"  Current as of: July 10, 2023  Content Version: 14.2  © 2024 [employer].   Care instructions adapted under license by [employer]. If you have questions about a medical condition or this instruction, always ask your healthcare professional. Healthwise[employer] Incorporated disclaims any warranty or liability for your use of this information.         Weeks 32 to 34 of Your Pregnancy: Care Instructions    Decide whether you want to bank or donate your baby's umbilical cord blood. If you want to save this blood, you have to arrange for it ahead of time.   Decide about circumcision. Personal, Worship, or cultural beliefs may play a role in your decision. You get to decide what you want for your baby.         Learn how to ease hemorrhoids.   Get more liquids, fruits, vegetables, and fiber in your diet.  Avoid sitting for too long.  Clean yourself with moist toilet paper. Or try witch hazel pads.  Try ice packs or warm sitz baths for discomfort.  Use hydrocortisone cream for pain or itching.  Ask your doctor about stool softeners.        Consider the benefits of breastfeeding.   It reduces your baby's risk of sudden infant death syndrome (SIDS).   babies are less likely to get certain infections. And they're less likely to be obese or get diabetes later in life.  It can lower your risk of  breast and ovarian cancers and osteoporosis.  It saves you money.  Follow-up care is a key part of your treatment and safety. Be sure to make and go to all appointments, and call your doctor if you are having problems. It's also a good idea to know your test results and keep a list of the medicines you take.  Where can you learn more?  Go to https://www.[employer].net/patientEd and enter X711 to learn more about \"Weeks 32 to 34 of Your Pregnancy: Care Instructions.\"  Current as of: July 10, 2023  Content Version: 14.2  © 2024 [employer].   Care instructions adapted under license by [employer]. If you have questions about a medical condition or this instruction, always ask your healthcare professional. Healthwise, Incorporated disclaims any warranty or liability for your use of this information.

## 2024-12-09 ENCOUNTER — HOSPITAL ENCOUNTER (EMERGENCY)
Facility: HOSPITAL | Age: 39
Discharge: HOME | End: 2024-12-09
Payer: COMMERCIAL

## 2024-12-09 VITALS
SYSTOLIC BLOOD PRESSURE: 143 MMHG | TEMPERATURE: 97.8 F | RESPIRATION RATE: 14 BRPM | DIASTOLIC BLOOD PRESSURE: 88 MMHG | HEART RATE: 92 BPM | OXYGEN SATURATION: 98 %

## 2024-12-09 LAB
ALBUMIN SERPL-MCNC: 2.6 G/DL (ref 3.5–5)
ALBUMIN/GLOB SERPL: 0.7 (ref 1.1–2.2)
ALP SERPL-CCNC: 125 U/L (ref 45–117)
ALT SERPL-CCNC: 73 U/L (ref 12–78)
ANION GAP SERPL CALC-SCNC: 10 MMOL/L (ref 2–12)
AST SERPL-CCNC: 35 U/L (ref 15–37)
BILIRUB SERPL-MCNC: 0.4 MG/DL (ref 0.2–1)
BUN SERPL-MCNC: 8 MG/DL (ref 6–20)
BUN/CREAT SERPL: 17 (ref 12–20)
CALCIUM SERPL-MCNC: 8.9 MG/DL (ref 8.5–10.1)
CHLORIDE SERPL-SCNC: 110 MMOL/L (ref 97–108)
CO2 SERPL-SCNC: 16 MMOL/L (ref 21–32)
CREAT SERPL-MCNC: 0.48 MG/DL (ref 0.55–1.02)
CREAT UR-MCNC: 28.8 MG/DL
ERYTHROCYTE [DISTWIDTH] IN BLOOD BY AUTOMATED COUNT: 14.3 % (ref 11.5–14.5)
GLOBULIN SER CALC-MCNC: 3.7 G/DL (ref 2–4)
GLUCOSE SERPL-MCNC: 99 MG/DL (ref 65–100)
HCT VFR BLD AUTO: 37.1 % (ref 35–47)
HGB BLD-MCNC: 12.8 G/DL (ref 11.5–16)
MCH RBC QN AUTO: 30.2 PG (ref 26–34)
MCHC RBC AUTO-ENTMCNC: 34.5 G/DL (ref 30–36.5)
MCV RBC AUTO: 87.5 FL (ref 80–99)
NRBC # BLD: 0 K/UL (ref 0–0.01)
NRBC BLD-RTO: 0 PER 100 WBC
PLATELET # BLD AUTO: 197 K/UL (ref 150–400)
PMV BLD AUTO: 11 FL (ref 8.9–12.9)
POTASSIUM SERPL-SCNC: 4.3 MMOL/L (ref 3.5–5.1)
PROT SERPL-MCNC: 6.3 G/DL (ref 6.4–8.2)
PROT UR-MCNC: 13 MG/DL (ref 0–11.9)
PROT/CREAT UR-RTO: 0.5
RBC # BLD AUTO: 4.24 M/UL (ref 3.8–5.2)
SODIUM SERPL-SCNC: 136 MMOL/L (ref 136–145)
WBC # BLD AUTO: 14.4 K/UL (ref 3.6–11)

## 2024-12-09 PROCEDURE — 82570 ASSAY OF URINE CREATININE: CPT

## 2024-12-09 PROCEDURE — 36415 COLL VENOUS BLD VENIPUNCTURE: CPT

## 2024-12-09 PROCEDURE — 4500000002 HC ER NO CHARGE

## 2024-12-09 PROCEDURE — 85027 COMPLETE CBC AUTOMATED: CPT

## 2024-12-09 PROCEDURE — 99283 EMERGENCY DEPT VISIT LOW MDM: CPT

## 2024-12-09 PROCEDURE — 80053 COMPREHEN METABOLIC PANEL: CPT

## 2024-12-09 PROCEDURE — 84156 ASSAY OF PROTEIN URINE: CPT

## 2024-12-09 NOTE — ED NOTES
Pt is a 39 yo  @ 34w5d who presents for repeat blood work to evaluate for severe pre-e.    She was seen on  and had the following labs from the office:  Cr 0.62, AST 31, ALT 52, Hgb 14.2, Plts 220.     She was sent to L&D on  for evaluation for elevated blood pressures and labs then were as follows  Cr 0.47, AST 32, ALT 73    Because the AST increased from 52 to 73 she was sent to L&D for repeat lab work.     Of note both values were in reference range as normal for the specific lab     She denies headaches, vision changes, RUQ pain or BLE edema.    She has no contractions, LOF, VB, VD, dysuria. She has great FM.       Pregnancy problem:   - Rh neg- s/p rhogam  - AMA, mat 21 neg  - BMI 36  - GDMA1- diet controlled, growth 74%ile      The history is provided by the patient.   Hypertension         Chief Complaint   Patient presents with    Hypertension    Follow-up       Past Medical History:   Diagnosis Date    Gestational diabetes     diet-controlled    Pre-eclampsia        Past Surgical History:   Procedure Laterality Date    APPENDECTOMY           No family history on file.    Social History     Socioeconomic History    Marital status: Single     Spouse name: Not on file    Number of children: Not on file    Years of education: Not on file    Highest education level: Not on file   Occupational History    Not on file   Tobacco Use    Smoking status: Every Day     Current packs/day: 0.25     Types: Cigarettes    Smokeless tobacco: Never   Substance and Sexual Activity    Alcohol use: Not Currently    Drug use: No    Sexual activity: Not on file   Other Topics Concern    Not on file   Social History Narrative    Not on file     Social Determinants of Health     Financial Resource Strain: Not on file   Food Insecurity: Not on file   Transportation Needs: Not on file   Physical Activity: Not on file   Stress: Not on file   Social Connections: Not on file   Intimate Partner Violence: Not on file   Housing

## 2024-12-09 NOTE — ED TRIAGE NOTES
12/9/2024 10:56 AM: Pt arrives to [facility] for PIH workup. Pt denies headache, blurry vision, or RUQ pain. Denies significant changes in edema or n/v. Pt endorses (+)FM, denies ctx, LOF, or VB. MD Holt notified of pt arrival     1220: MD Holt updated on lab results and pt status    1248: MD Holt at bs to see pt. Plan to d/c home     1305: Discharge instructions and After Visit Summary provided to pt. All of pt's questions answered; pt verbalizes understanding and has no further questions. Pt ambulatory off unit to discharge home.

## 2024-12-09 NOTE — PROGRESS NOTES
12:48 PM  Talked with MFM Dr. Benjamin about Pre E and lab values.   ALT unchanged from 12/6, others stable  P/c 0.4-->0.5.   Per Dr. Benjamin, cannot use other lab reference ranges to determine SF.   Pt asymptomatic with mild range Bps.   Dr. Benjamin recommends discharge home with initiation of twice weekly ANS.   Will follow up with myself tomorrow with NST and repeat labs, and MFM on Friday (4 days).   This was all relayed to OB Hospitalist, Dr. Holt, who has graciously been assisting with this patients care today.     Natalie Zavala MD

## 2024-12-13 ENCOUNTER — ROUTINE PRENATAL (OUTPATIENT)
Age: 39
End: 2024-12-13

## 2024-12-13 VITALS — DIASTOLIC BLOOD PRESSURE: 87 MMHG | SYSTOLIC BLOOD PRESSURE: 128 MMHG | HEART RATE: 108 BPM

## 2024-12-13 DIAGNOSIS — O24.410 DIET CONTROLLED WHITE CLASSIFICATION A1 GESTATIONAL DIABETES MELLITUS (GDM): ICD-10-CM

## 2024-12-13 DIAGNOSIS — Z3A.35 35 WEEKS GESTATION OF PREGNANCY: Primary | ICD-10-CM

## 2024-12-13 NOTE — PROCEDURES
PATIENT: DEB BROWN   -  : 1985   -  DOS:2024   -  INTERPRETING PROVIDER:Truong Miguel,   Indication  ========    Advanced Maternal Age, Gestational diabetes, Obesity in pregnancy    Method  ======    Transabdominal ultrasound examination. View: Suboptimal view: limited by maternal body habitus and fetal position    Pregnancy  =========    Poole pregnancy. Number of fetuses: 1    Dating  ======    LMP on: 4/10/2024  Cycle: regular cycle  GA by LMP 35 w + 2 d  CAROLANN by LMP: 1/15/2025  Ultrasound examination on: 2024  GA by U/S based upon: AC, BPD, Femur, HC  GA by U/S 37 w + 2 d  CAROLANN by U/S: 2025  Assigned: based on the LMP, selected on 2024  Assigned GA 35 w + 2 d  Assigned CAROLANN: 1/15/2025    Fetal Biometry  ============    Standard  BPD 95.9 mm 39w 1d >99% Hadlock  .1 mm -/- >99% Jermaine  .5 mm 40w 1d 99% Hadlock  .1 mm 34w 3d 33% Hadlock  Femur 68.3 mm 35w 1d 38% Hadlock  EFW 2,739 g 35w 5d 59% Hadlock  EFW (lb) 6 lb  EFW (oz) 1 oz  EFW by: Hadlock (BPD-HC-AC-FL)  Other Structures   bpm    General Evaluation  ==============    Cardiac activity present.  bpm. Fetal movements: visualized. Presentation: Cephalic  Placenta: Placental site: Anterior  Umbilical cord: Cord vessels: 3 vessel cord. Insertion site: central  Amniotic fluid: Amount of AF: normal. MVP 7.2 cm. ESTRELLA 19.4 cm. Q1 5.3 cm, Q2 7.2 cm, Q3 1.5 cm, Q4 5.5 cm    Fetal Anatomy  ===========    Profile: SUBOPTIMAL  Face  Palate: SUBOPTIMAL  Maxilla: SUBOPTIMAL  Mandible: SUBOPTIMAL  RVOT view: NOT VISUALIZED  3-vessel view: NOT VISUALIZED  3-vessel-trachea view: NOT VISUALIZED  Heart / Thorax  Aortic arch view: NOT VISUALIZED  Bicaval view: NOT VISUALIZED  Cord insertion: NOT VISUALIZED  Stomach: normal  Kidneys: normal  Bladder: normal  Cervical spine: SUBOPTIMAL  Thoracic spine: SUBOPTIMAL  Lumbar spine: SUBOPTIMAL  Sacral spine: SUBOPTIMAL  Rt fingers: SUBOPTIMAL  Lt upper

## 2024-12-16 ENCOUNTER — ROUTINE PRENATAL (OUTPATIENT)
Age: 39
End: 2024-12-16
Payer: COMMERCIAL

## 2024-12-16 VITALS — HEART RATE: 86 BPM | DIASTOLIC BLOOD PRESSURE: 83 MMHG | SYSTOLIC BLOOD PRESSURE: 135 MMHG

## 2024-12-16 DIAGNOSIS — Z3A.35 35 WEEKS GESTATION OF PREGNANCY: Primary | ICD-10-CM

## 2024-12-16 PROCEDURE — 99214 OFFICE O/P EST MOD 30 MIN: CPT | Performed by: STUDENT IN AN ORGANIZED HEALTH CARE EDUCATION/TRAINING PROGRAM

## 2024-12-16 PROCEDURE — 76819 FETAL BIOPHYS PROFIL W/O NST: CPT | Performed by: STUDENT IN AN ORGANIZED HEALTH CARE EDUCATION/TRAINING PROGRAM

## 2024-12-16 NOTE — PROGRESS NOTES
Please look under media to view full consult and ultrasound report in ViewPoint.     Jennifer Nassar , was evaluated through a synchronous (real-time) audio-video encounter.     The patient (or guardian if applicable) is aware that this is a billable service, which includes applicable co-pays. This Virtual Visit was conducted with patient's (and/or legal guardian's) consent. Patient identification was verified, and a caregiver was present when appropriate.     This patient was located at home facility [facility] [address] [place] [address].     Provider was located at facility [facility] [address] [place] [address] [address] [place] [address].     Confirm you are appropriately licensed, registered, or certified to deliver care in the state where the patient is located as indicated above. If you are not or unsure, please re-schedule the visit: Yes, I confirm.    Aisha Benjamin MD   Maternal Fetal Medicine

## 2024-12-16 NOTE — PROCEDURES
PATIENT: DEB BROWN   -  : 1985   -  DOS:2024   -  INTERPRETING PROVIDER:Aisha Benjamin,   Indication  ========    Advanced Maternal Age, Gestational diabetes, Obesity in pregnancy    Method  ======    Transabdominal ultrasound examination. View: Suboptimal view: limited by fetal position. Suboptimal view: limited by late gestational age    Pregnancy  =========    Poole pregnancy. Number of fetuses: 1    Dating  ======    LMP on: 4/10/2024  Cycle: regular cycle  GA by LMP 35 w + 5 d  CAROLANN by LMP: 1/15/2025  Assigned: based on the LMP, selected on 2024  Assigned GA 35 w + 5 d  Assigned CAROLANN: 1/15/2025    General Evaluation  ==============    Cardiac activity present.  bpm. Fetal movements: visualized. Presentation: Cephalic  Placenta: Placental site: Anterior  Umbilical cord: Cord vessels: 3 vessel cord    Fetal Anatomy  ===========    Profile: SUBOPTIMAL  Face  Palate: SUBOPTIMAL  Maxilla: SUBOPTIMAL  Mandible: SUBOPTIMAL  RVOT view: NOT VISUALIZED  3-vessel view: NOT VISUALIZED  3-vessel-trachea view: NOT VISUALIZED  Heart / Thorax  Aortic arch view: NOT VISUALIZED  Bicaval view: NOT VISUALIZED  Cord insertion: NOT VISUALIZED  Stomach: normal  Kidneys: normal  Bladder: normal  Cervical spine: SUBOPTIMAL  Thoracic spine: SUBOPTIMAL  Lumbar spine: SUBOPTIMAL  Sacral spine: SUBOPTIMAL  Rt fingers: SUBOPTIMAL  Lt upper arm: suboptimal  Lt hand: SUBOPTIMAL  Lt fingers: SUBOPTIMAL  Wants to know fetal sex: yes    Amniotic Fluid Assessment  =====================    Amount of AF: normal  MVP 5.3 cm. ESTRELLA 13.9 cm. Q1 3.8 cm, Q2 2.5 cm, Q3 2.3 cm, Q4 5.3 cm    Biophysical Profile  ==============    2: Fetal breathing movements  2: Gross body movements  2: Fetal tone  2: Amniotic fluid volume   Biophysical profile score    Findings  =======    Intrauterine Poole pregnancy at 35w 5d by clinical dates.  Amniotic fluid: normal.  Placenta is Anterior.  Biophysical profile score is

## 2024-12-18 LAB — GBS, EXTERNAL RESULT: POSITIVE

## 2024-12-20 ENCOUNTER — ROUTINE PRENATAL (OUTPATIENT)
Age: 39
End: 2024-12-20

## 2024-12-20 ENCOUNTER — ROUTINE PRENATAL (OUTPATIENT)
Age: 39
End: 2024-12-20
Payer: COMMERCIAL

## 2024-12-20 VITALS — SYSTOLIC BLOOD PRESSURE: 138 MMHG | HEART RATE: 81 BPM | DIASTOLIC BLOOD PRESSURE: 77 MMHG

## 2024-12-20 DIAGNOSIS — K57.90 DIVERTICULOSIS: ICD-10-CM

## 2024-12-20 DIAGNOSIS — O24.410 DIET CONTROLLED WHITE CLASSIFICATION A1 GESTATIONAL DIABETES MELLITUS (GDM): Primary | ICD-10-CM

## 2024-12-20 DIAGNOSIS — O24.410 DIET CONTROLLED WHITE CLASSIFICATION A1 GESTATIONAL DIABETES MELLITUS (GDM): ICD-10-CM

## 2024-12-20 DIAGNOSIS — O09.523 MULTIGRAVIDA OF ADVANCED MATERNAL AGE IN THIRD TRIMESTER: ICD-10-CM

## 2024-12-20 DIAGNOSIS — Z3A.36 36 WEEKS GESTATION OF PREGNANCY: Primary | ICD-10-CM

## 2024-12-20 DIAGNOSIS — F41.9 ANXIETY: ICD-10-CM

## 2024-12-20 DIAGNOSIS — O14.93 PREECLAMPSIA, THIRD TRIMESTER: ICD-10-CM

## 2024-12-20 PROCEDURE — 76815 OB US LIMITED FETUS(S): CPT | Performed by: STUDENT IN AN ORGANIZED HEALTH CARE EDUCATION/TRAINING PROGRAM

## 2024-12-20 PROCEDURE — 59025 FETAL NON-STRESS TEST: CPT | Performed by: STUDENT IN AN ORGANIZED HEALTH CARE EDUCATION/TRAINING PROGRAM

## 2024-12-20 NOTE — PROGRESS NOTES
Assessment & Plan   ASSESSMENT/PLAN:  1. Diet controlled White classification A1 gestational diabetes mellitus (GDM)  2. Diverticulosis  3. Anxiety  4. Multigravida of advanced maternal age in third trimester  5. Preeclampsia, third trimester    JENNIFER is 38 yrs of age,  seen for a pregnancy complicated by:     Reassuring modified BPP and normal ESTRELLA today.      Gestational Diabetes:   - Prev counseled   - Recommend serial growth scans from diagnosis   - Weekly ANT starting at 36 weeks but if insulin started then commence at 32 weeks   - Delivery 37 weeks due to preeclampsia  - Log reviewed: Overall controlled with diet.   - Continue diet control management      Diverticulosis  - stable, no meds      Anxiety   - Lexapro 10 mg daily   - Mood stable      Obesity Class I   - Continue ldASA      AMA   - Normal NIPT   - Declined GC      Preeclampsia without severe features   - Diagnosed on  based on blood pressure and PC ratio of 0.5  - No symptoms today  - /77  - Kick count instructions, PIH and PTL precautions reviewed.     Recommendations  No Maternal Fetal Medicine follow up is indicated. We will gladly see your patient as clinically indicated.   IOL -    Patient images have been reviewed. Agree with the plan of care as outlined above.    Aisha Benjamin MD   Maternal Fetal Medicine     Please see Viewpoint for ultrasound findings    Subjective   Jennifer Nassar (:  1985) is a 39 y.o. female,Established patient, here for evaluation of the following chief complaint(s):  1. Diet controlled White classification A1 gestational diabetes mellitus (GDM)  2. Diverticulosis  3. Anxiety  4. Multigravida of advanced maternal age in third trimester  5. Preeclampsia, third trimester    Objective   Physical Exam  Vitals reviewed.   Constitutional:       Appearance: Normal appearance.   Neurological:      Mental Status: She is alert.   Psychiatric:         Mood and Affect: Mood normal.         Judgment:

## 2024-12-23 ENCOUNTER — HOSPITAL ENCOUNTER (INPATIENT)
Facility: HOSPITAL | Age: 39
LOS: 7 days | Discharge: HOME | End: 2024-12-30
Attending: STUDENT IN AN ORGANIZED HEALTH CARE EDUCATION/TRAINING PROGRAM | Admitting: OBSTETRICS & GYNECOLOGY
Payer: COMMERCIAL

## 2024-12-23 DIAGNOSIS — Z98.891 S/P CESAREAN SECTION: Primary | ICD-10-CM

## 2024-12-23 PROBLEM — O14.90 PRE-ECLAMPSIA AFFECTING PREGNANCY, ANTEPARTUM: Status: ACTIVE | Noted: 2024-12-23

## 2024-12-23 LAB
ABO + RH BLD: NORMAL
ALBUMIN SERPL-MCNC: 2.7 G/DL (ref 3.5–5)
ALBUMIN/GLOB SERPL: 0.8 (ref 1.1–2.2)
ALP SERPL-CCNC: 142 U/L (ref 45–117)
ALT SERPL-CCNC: 61 U/L (ref 12–78)
ANION GAP SERPL CALC-SCNC: 9 MMOL/L (ref 2–12)
AST SERPL-CCNC: 32 U/L (ref 15–37)
BASOPHILS # BLD: 0 K/UL (ref 0–0.1)
BASOPHILS NFR BLD: 0 % (ref 0–1)
BILIRUB SERPL-MCNC: 0.5 MG/DL (ref 0.2–1)
BLOOD GROUP ANTIBODIES SERPL: NORMAL
BUN SERPL-MCNC: 15 MG/DL (ref 6–20)
BUN/CREAT SERPL: 26 (ref 12–20)
CALCIUM SERPL-MCNC: 8.9 MG/DL (ref 8.5–10.1)
CHLORIDE SERPL-SCNC: 109 MMOL/L (ref 97–108)
CO2 SERPL-SCNC: 18 MMOL/L (ref 21–32)
CREAT SERPL-MCNC: 0.57 MG/DL (ref 0.55–1.02)
DIFFERENTIAL METHOD BLD: ABNORMAL
EOSINOPHIL # BLD: 0.1 K/UL (ref 0–0.4)
EOSINOPHIL NFR BLD: 0 % (ref 0–7)
ERYTHROCYTE [DISTWIDTH] IN BLOOD BY AUTOMATED COUNT: 14.3 % (ref 11.5–14.5)
GLOBULIN SER CALC-MCNC: 3.5 G/DL (ref 2–4)
GLUCOSE BLD STRIP.AUTO-MCNC: 88 MG/DL (ref 65–117)
GLUCOSE SERPL-MCNC: 95 MG/DL (ref 65–100)
HCT VFR BLD AUTO: 38 % (ref 35–47)
HGB BLD-MCNC: 13.1 G/DL (ref 11.5–16)
IMM GRANULOCYTES # BLD AUTO: 0.1 K/UL (ref 0–0.04)
IMM GRANULOCYTES NFR BLD AUTO: 1 % (ref 0–0.5)
LDH SERPL L TO P-CCNC: 172 U/L (ref 81–246)
LYMPHOCYTES # BLD: 1.6 K/UL (ref 0.8–3.5)
LYMPHOCYTES NFR BLD: 10 % (ref 12–49)
MCH RBC QN AUTO: 29.8 PG (ref 26–34)
MCHC RBC AUTO-ENTMCNC: 34.5 G/DL (ref 30–36.5)
MCV RBC AUTO: 86.6 FL (ref 80–99)
MONOCYTES # BLD: 1 K/UL (ref 0–1)
MONOCYTES NFR BLD: 6 % (ref 5–13)
NEUTS SEG # BLD: 12.8 K/UL (ref 1.8–8)
NEUTS SEG NFR BLD: 83 % (ref 32–75)
NRBC # BLD: 0 K/UL (ref 0–0.01)
NRBC BLD-RTO: 0 PER 100 WBC
PLATELET # BLD AUTO: 209 K/UL (ref 150–400)
PMV BLD AUTO: 12.2 FL (ref 8.9–12.9)
POTASSIUM SERPL-SCNC: 4.1 MMOL/L (ref 3.5–5.1)
PROT SERPL-MCNC: 6.2 G/DL (ref 6.4–8.2)
RBC # BLD AUTO: 4.39 M/UL (ref 3.8–5.2)
RPR SER QL: NONREACTIVE
SERVICE CMNT-IMP: NORMAL
SODIUM SERPL-SCNC: 136 MMOL/L (ref 136–145)
SPECIMEN EXP DATE BLD: NORMAL
WBC # BLD AUTO: 15.6 K/UL (ref 3.6–11)

## 2024-12-23 PROCEDURE — 85025 COMPLETE CBC W/AUTO DIFF WBC: CPT

## 2024-12-23 PROCEDURE — 80053 COMPREHEN METABOLIC PANEL: CPT

## 2024-12-23 PROCEDURE — 86850 RBC ANTIBODY SCREEN: CPT

## 2024-12-23 PROCEDURE — 86901 BLOOD TYPING SEROLOGIC RH(D): CPT

## 2024-12-23 PROCEDURE — 86592 SYPHILIS TEST NON-TREP QUAL: CPT

## 2024-12-23 PROCEDURE — 6370000000 HC RX 637 (ALT 250 FOR IP): Performed by: OBSTETRICS & GYNECOLOGY

## 2024-12-23 PROCEDURE — 7210000100 HC LABOR FEE PER 1 HR

## 2024-12-23 PROCEDURE — 36415 COLL VENOUS BLD VENIPUNCTURE: CPT

## 2024-12-23 PROCEDURE — 59200 INSERT CERVICAL DILATOR: CPT

## 2024-12-23 PROCEDURE — 1100000000 HC RM PRIVATE

## 2024-12-23 PROCEDURE — 86900 BLOOD TYPING SEROLOGIC ABO: CPT

## 2024-12-23 PROCEDURE — 82962 GLUCOSE BLOOD TEST: CPT

## 2024-12-23 PROCEDURE — 83615 LACTATE (LD) (LDH) ENZYME: CPT

## 2024-12-23 RX ORDER — SODIUM CHLORIDE, SODIUM LACTATE, POTASSIUM CHLORIDE, CALCIUM CHLORIDE 600; 310; 30; 20 MG/100ML; MG/100ML; MG/100ML; MG/100ML
INJECTION, SOLUTION INTRAVENOUS CONTINUOUS
Status: DISCONTINUED | OUTPATIENT
Start: 2024-12-23 | End: 2024-12-30 | Stop reason: SDUPTHER

## 2024-12-23 RX ORDER — MISOPROSTOL 200 UG/1
400 TABLET ORAL PRN
Status: DISCONTINUED | OUTPATIENT
Start: 2024-12-23 | End: 2024-12-30 | Stop reason: HOSPADM

## 2024-12-23 RX ORDER — SODIUM CHLORIDE, SODIUM LACTATE, POTASSIUM CHLORIDE, AND CALCIUM CHLORIDE .6; .31; .03; .02 G/100ML; G/100ML; G/100ML; G/100ML
500 INJECTION, SOLUTION INTRAVENOUS PRN
Status: DISCONTINUED | OUTPATIENT
Start: 2024-12-23 | End: 2024-12-30 | Stop reason: HOSPADM

## 2024-12-23 RX ORDER — SODIUM CHLORIDE 9 MG/ML
25 INJECTION, SOLUTION INTRAVENOUS PRN
Status: DISCONTINUED | OUTPATIENT
Start: 2024-12-23 | End: 2024-12-30 | Stop reason: SDUPTHER

## 2024-12-23 RX ORDER — SODIUM CHLORIDE 0.9 % (FLUSH) 0.9 %
5-40 SYRINGE (ML) INJECTION PRN
Status: DISCONTINUED | OUTPATIENT
Start: 2024-12-23 | End: 2024-12-30

## 2024-12-23 RX ORDER — TERBUTALINE SULFATE 1 MG/ML
0.25 INJECTION, SOLUTION SUBCUTANEOUS
Status: ACTIVE | OUTPATIENT
Start: 2024-12-23 | End: 2024-12-24

## 2024-12-23 RX ORDER — METHYLERGONOVINE MALEATE 0.2 MG/ML
200 INJECTION INTRAVENOUS PRN
Status: DISCONTINUED | OUTPATIENT
Start: 2024-12-23 | End: 2024-12-30 | Stop reason: HOSPADM

## 2024-12-23 RX ORDER — ONDANSETRON 4 MG/1
4 TABLET, ORALLY DISINTEGRATING ORAL EVERY 6 HOURS PRN
Status: DISCONTINUED | OUTPATIENT
Start: 2024-12-23 | End: 2024-12-30 | Stop reason: SDUPTHER

## 2024-12-23 RX ORDER — MAGNESIUM SULFATE HEPTAHYDRATE 40 MG/ML
2000 INJECTION, SOLUTION INTRAVENOUS ONCE
Status: DISCONTINUED | OUTPATIENT
Start: 2024-12-23 | End: 2024-12-26

## 2024-12-23 RX ORDER — FAMOTIDINE 20 MG/1
20 TABLET, FILM COATED ORAL 2 TIMES DAILY
Status: DISCONTINUED | OUTPATIENT
Start: 2024-12-23 | End: 2024-12-30 | Stop reason: HOSPADM

## 2024-12-23 RX ORDER — SODIUM CHLORIDE 0.9 % (FLUSH) 0.9 %
5-40 SYRINGE (ML) INJECTION EVERY 12 HOURS SCHEDULED
Status: DISCONTINUED | OUTPATIENT
Start: 2024-12-23 | End: 2024-12-28

## 2024-12-23 RX ORDER — SODIUM CHLORIDE 9 MG/ML
INJECTION, SOLUTION INTRAVENOUS PRN
Status: DISCONTINUED | OUTPATIENT
Start: 2024-12-23 | End: 2024-12-30 | Stop reason: SDUPTHER

## 2024-12-23 RX ORDER — MAGNESIUM SULFATE HEPTAHYDRATE 40 MG/ML
4000 INJECTION, SOLUTION INTRAVENOUS ONCE
Status: COMPLETED | OUTPATIENT
Start: 2024-12-23 | End: 2024-12-26

## 2024-12-23 RX ORDER — DOCUSATE SODIUM 100 MG/1
100 CAPSULE, LIQUID FILLED ORAL 2 TIMES DAILY
Status: DISCONTINUED | OUTPATIENT
Start: 2024-12-23 | End: 2024-12-30 | Stop reason: HOSPADM

## 2024-12-23 RX ORDER — CARBOPROST TROMETHAMINE 250 UG/ML
250 INJECTION, SOLUTION INTRAMUSCULAR PRN
Status: DISCONTINUED | OUTPATIENT
Start: 2024-12-23 | End: 2024-12-30 | Stop reason: HOSPADM

## 2024-12-23 RX ORDER — ONDANSETRON 2 MG/ML
4 INJECTION INTRAMUSCULAR; INTRAVENOUS EVERY 6 HOURS PRN
Status: DISCONTINUED | OUTPATIENT
Start: 2024-12-23 | End: 2024-12-30 | Stop reason: SDUPTHER

## 2024-12-23 RX ORDER — CALCIUM GLUCONATE 94 MG/ML
1000 INJECTION, SOLUTION INTRAVENOUS PRN
Status: DISCONTINUED | OUTPATIENT
Start: 2024-12-23 | End: 2024-12-26 | Stop reason: SDUPTHER

## 2024-12-23 RX ORDER — SODIUM CHLORIDE 9 MG/ML
INJECTION, SOLUTION INTRAVENOUS CONTINUOUS
Status: DISCONTINUED | OUTPATIENT
Start: 2024-12-23 | End: 2024-12-30 | Stop reason: HOSPADM

## 2024-12-23 RX ADMIN — Medication 25 MCG: at 20:36

## 2024-12-23 RX ADMIN — DOCUSATE SODIUM 100 MG: 100 CAPSULE, LIQUID FILLED ORAL at 20:36

## 2024-12-23 RX ADMIN — Medication 50 MCG: at 16:44

## 2024-12-23 RX ADMIN — FAMOTIDINE 20 MG: 20 TABLET, FILM COATED ORAL at 20:36

## 2024-12-23 NOTE — PROGRESS NOTES
Patient was seen 12/23/2024      Please look under media to view full consult and ultrasound report in ViewPoint.         Aisha Benjamin MD   Maternal Fetal Medicine

## 2024-12-23 NOTE — H&P
History & Physical    Name: Jennifer Nassar MRN: [id number]  SSN: [id number]    YOB: 1985  Age: 39 y.o.  Sex: female      Subjective:     Chief Complaint:  Pregnancy and pre eclampsia with severe features    Estimated Date of Delivery: 1/15/25  OB History    Para Term  AB Living   1             SAB IAB Ectopic Molar Multiple Live Births                    # Outcome Date GA Lbr Devin/2nd Weight Sex Type Anes PTL Lv   1 Current                Ms. Nassar is admitted with pregnancy at 36w5d for induction of labor due to pre eclampsia with severe features.   Please see prenatal records which have also been sent to Labor and Delivery and added to Epic for details.    She received the diagnosis of GHTN at 34w, which progressed to pre eclampsia w with P/C ratio 0.5. She has been receiving weekly labs and twice weekly  surveillance.   Her labs were notable for an increasing ALT, of which didn't meet criteria for SF until the most recent draw.     Her ALT has increased now to 69, of which the upper limit for normal is 32.   She has no headaches, vision changes, RUQ pain.   She has been monitoring her Bps which are largely mild range. She is on no medication.   She has great FM, no LOF, VB,VD dysuria.     Pregnancy problems:   - GDMA1- no meds, growth 59%ile  - Pre Eclampsia with SF, dx today at 36w5d with elevated ALT  - BMI 36  - GBS +  - Rh neg s/p rhogam at 28 weeks  - AMA age 39- NIPT normal  - Anxiety on lexapro  - diverticulosis- no meds stable    Past Medical History:   Diagnosis Date    Gestational diabetes     diet-controlled    Gestational diabetes mellitus 10/30    Pre-eclampsia      Past Surgical History:   Procedure Laterality Date    APPENDECTOMY       Social History     Occupational History    Not on file   Tobacco Use    Smoking status: Former     Current packs/day: 0.50     Average packs/day: 0.5 packs/day for 10.0 years (5.0 ttl pk-yrs)     Types: Cigarettes    Smokeless

## 2024-12-23 NOTE — PROCEDURES
PATIENT: DEB BROWN   -  : 1985   -  DOS:2024   -  INTERPRETING PROVIDER:Aisha Benjamin,   Indication  ========    Advanced Maternal Age, Gestational diabetes, Obesity in pregnancy    Method  ======    External Fetal Monitor and transabdominal ultrasound examination. View: suboptimal due to advanced gestational age    Pregnancy  =========    Poole pregnancy. Number of fetuses: 1    Dating  ======    LMP on: 4/10/2024  Cycle: regular cycle  GA by LMP 36 w + 2 d  CAROLANN by LMP: 1/15/2025  Assigned: based on the LMP, selected on 2024  Assigned GA 36 w + 2 d  Assigned CAROLANN: 1/15/2025    General Evaluation  ==============    Cardiac activity present.  bpm. Fetal movements: visualized. Presentation: Cephalic  Placenta: Placental site: Anterior  Umbilical cord: Cord vessels: 3 vessel cord    Fetal Anatomy  ===========    Profile: normal  Face  Palate: normal  Maxilla: normal  Mandible: normal  RVOT view: NOT VISUALIZED  3-vessel view: NOT VISUALIZED  3-vessel-trachea view: NOT VISUALIZED  Heart / Thorax  Aortic arch view: NOT VISUALIZED  Bicaval view: NOT VISUALIZED  Cord insertion: NOT VISUALIZED  Stomach: normal  Kidneys: normal  Bladder: normal  Cervical spine: SUBOPTIMAL  Thoracic spine: SUBOPTIMAL  Lumbar spine: SUBOPTIMAL  Sacral spine: SUBOPTIMAL  Rt fingers: normal  Lt upper arm: suboptimal  Lt hand: normal  Lt fingers: normal  Wants to know fetal sex: yes    Amniotic Fluid Assessment  =====================    Amount of AF: normal  MVP 8.1 cm. ESTRELLA 23.3 cm. Q1 8.1 cm, Q2 5.1 cm, Q3 5.4 cm, Q4 4.7 cm    Non Stress Test  =============    NST interpretation: reactive. Test duration 20 min. Baseline  bpm. Baseline variability: moderate. Accelerations: present. Decelerations: absent. Uterine activity:  absent    Findings  =======    Intrauterine Poole pregnancy at 36w 2d by clinical dates.  Amniotic fluid: normal.  Placenta is Anterior.  Cephalic presentation.    The structures

## 2024-12-24 LAB
GLUCOSE BLD STRIP.AUTO-MCNC: 104 MG/DL (ref 65–117)
GLUCOSE BLD STRIP.AUTO-MCNC: 132 MG/DL (ref 65–117)
GLUCOSE BLD STRIP.AUTO-MCNC: 138 MG/DL (ref 65–117)
GLUCOSE BLD STRIP.AUTO-MCNC: 83 MG/DL (ref 65–117)
SERVICE CMNT-IMP: ABNORMAL
SERVICE CMNT-IMP: ABNORMAL
SERVICE CMNT-IMP: NORMAL
SERVICE CMNT-IMP: NORMAL

## 2024-12-24 PROCEDURE — 2580000003 HC RX 258: Performed by: OBSTETRICS & GYNECOLOGY

## 2024-12-24 PROCEDURE — 7210000100 HC LABOR FEE PER 1 HR

## 2024-12-24 PROCEDURE — 82962 GLUCOSE BLOOD TEST: CPT

## 2024-12-24 PROCEDURE — 1100000000 HC RM PRIVATE

## 2024-12-24 PROCEDURE — 6360000002 HC RX W HCPCS: Performed by: OBSTETRICS & GYNECOLOGY

## 2024-12-24 PROCEDURE — 59200 INSERT CERVICAL DILATOR: CPT

## 2024-12-24 PROCEDURE — C1726 CATH, BAL DIL, NON-VASCULAR: HCPCS

## 2024-12-24 PROCEDURE — 6370000000 HC RX 637 (ALT 250 FOR IP): Performed by: OBSTETRICS & GYNECOLOGY

## 2024-12-24 RX ORDER — BUTORPHANOL TARTRATE 1 MG/ML
1 INJECTION, SOLUTION INTRAMUSCULAR; INTRAVENOUS
Status: DISCONTINUED | OUTPATIENT
Start: 2024-12-24 | End: 2024-12-30 | Stop reason: HOSPADM

## 2024-12-24 RX ORDER — ESCITALOPRAM OXALATE 10 MG/1
10 TABLET ORAL DAILY
Status: DISCONTINUED | OUTPATIENT
Start: 2024-12-24 | End: 2024-12-30 | Stop reason: HOSPADM

## 2024-12-24 RX ADMIN — DOCUSATE SODIUM 100 MG: 100 CAPSULE, LIQUID FILLED ORAL at 10:06

## 2024-12-24 RX ADMIN — PENICILLIN G POTASSIUM 5 MILLION UNITS: 5000000 INJECTION, POWDER, FOR SOLUTION INTRAMUSCULAR; INTRAVENOUS at 20:08

## 2024-12-24 RX ADMIN — Medication 25 MCG: at 00:43

## 2024-12-24 RX ADMIN — FAMOTIDINE 20 MG: 20 TABLET, FILM COATED ORAL at 10:06

## 2024-12-24 RX ADMIN — OXYTOCIN 1 MILLI-UNITS/MIN: 10 INJECTION, SOLUTION INTRAMUSCULAR; INTRAVENOUS at 19:07

## 2024-12-24 RX ADMIN — ESCITALOPRAM OXALATE 10 MG: 10 TABLET ORAL at 10:06

## 2024-12-24 RX ADMIN — Medication 25 MCG: at 13:41

## 2024-12-24 RX ADMIN — FAMOTIDINE 20 MG: 20 TABLET, FILM COATED ORAL at 20:12

## 2024-12-24 RX ADMIN — PROMETHAZINE HYDROCHLORIDE 25 MG: 25 INJECTION INTRAMUSCULAR; INTRAVENOUS at 11:35

## 2024-12-24 RX ADMIN — Medication 25 MCG: at 04:43

## 2024-12-24 RX ADMIN — DOCUSATE SODIUM 100 MG: 100 CAPSULE, LIQUID FILLED ORAL at 20:12

## 2024-12-24 RX ADMIN — SODIUM CHLORIDE, POTASSIUM CHLORIDE, SODIUM LACTATE AND CALCIUM CHLORIDE: 600; 310; 30; 20 INJECTION, SOLUTION INTRAVENOUS at 19:06

## 2024-12-24 RX ADMIN — Medication 25 MCG: at 09:19

## 2024-12-24 RX ADMIN — BUTORPHANOL TARTRATE 1 MG: 1 INJECTION, SOLUTION INTRAMUSCULAR; INTRAVENOUS at 11:04

## 2024-12-24 ASSESSMENT — PAIN SCALES - GENERAL: PAINLEVEL_OUTOF10: 7

## 2024-12-24 NOTE — PLAN OF CARE
Problem: Chronic Conditions and Co-morbidities  Goal: Patient's chronic conditions and co-morbidity symptoms are monitored and maintained or improved  Flowsheets (Taken 2024)  Care Plan - Patient's Chronic Conditions and Co-Morbidity Symptoms are Monitored and Maintained or Improved:   Monitor and assess patient's chronic conditions and comorbid symptoms for stability, deterioration, or improvement   Collaborate with multidisciplinary team to address chronic and comorbid conditions and prevent exacerbation or deterioration   Update acute care plan with appropriate goals if chronic or comorbid symptoms are exacerbated and prevent overall improvement and discharge     Problem: Pain  Goal: Verbalizes/displays adequate comfort level or baseline comfort level  Flowsheets (Taken 2024)  Verbalizes/displays adequate comfort level or baseline comfort level:   Encourage patient to monitor pain and request assistance   Administer analgesics based on type and severity of pain and evaluate response   Consider cultural and social influences on pain and pain management   Assess pain using appropriate pain scale   Implement non-pharmacological measures as appropriate and evaluate response   Notify Licensed Independent Practitioner if interventions unsuccessful or patient reports new pain     Problem: Vaginal Birth or  Section  Goal: Fetal and maternal status remain reassuring during the birth process  Description:  Birth OB-Pregnancy care plan goal which identifies if the fetal and maternal status remain reassuring during the birth process  Flowsheets (Taken 2024)  Fetal and Maternal Status Remain Reassuring During the Birth Process:   Monitor vital signs   Monitor labor progression (Vaginal delivery)   Monitor fetal heart rate   Monitor uterine activity     Problem: Infection - Adult  Goal: Absence of infection during hospitalization  Flowsheets (Taken 2024)  Absence of

## 2024-12-25 ENCOUNTER — ANESTHESIA (OUTPATIENT)
Facility: HOSPITAL | Age: 39
End: 2024-12-25
Payer: COMMERCIAL

## 2024-12-25 ENCOUNTER — ANESTHESIA EVENT (OUTPATIENT)
Facility: HOSPITAL | Age: 39
End: 2024-12-25
Payer: COMMERCIAL

## 2024-12-25 LAB
GLUCOSE BLD STRIP.AUTO-MCNC: 121 MG/DL (ref 65–117)
GLUCOSE BLD STRIP.AUTO-MCNC: 87 MG/DL (ref 65–117)
GLUCOSE BLD STRIP.AUTO-MCNC: 98 MG/DL (ref 65–117)
SERVICE CMNT-IMP: ABNORMAL
SERVICE CMNT-IMP: NORMAL
SERVICE CMNT-IMP: NORMAL

## 2024-12-25 PROCEDURE — 6370000000 HC RX 637 (ALT 250 FOR IP): Performed by: OBSTETRICS & GYNECOLOGY

## 2024-12-25 PROCEDURE — 59200 INSERT CERVICAL DILATOR: CPT

## 2024-12-25 PROCEDURE — 7210000100 HC LABOR FEE PER 1 HR

## 2024-12-25 PROCEDURE — 6360000002 HC RX W HCPCS: Performed by: OBSTETRICS & GYNECOLOGY

## 2024-12-25 PROCEDURE — 1100000000 HC RM PRIVATE

## 2024-12-25 PROCEDURE — 82962 GLUCOSE BLOOD TEST: CPT

## 2024-12-25 PROCEDURE — 2580000003 HC RX 258: Performed by: OBSTETRICS & GYNECOLOGY

## 2024-12-25 RX ORDER — CALCIUM CARBONATE 500 MG/1
2500 TABLET, CHEWABLE ORAL ONCE
Status: COMPLETED | OUTPATIENT
Start: 2024-12-25 | End: 2024-12-25

## 2024-12-25 RX ADMIN — ANTACID TABLETS 2500 MG: 500 TABLET, CHEWABLE ORAL at 10:57

## 2024-12-25 RX ADMIN — Medication 25 MCG: at 19:55

## 2024-12-25 RX ADMIN — Medication 25 MCG: at 15:57

## 2024-12-25 RX ADMIN — SODIUM CHLORIDE 2.5 MILLION UNITS: 9 INJECTION, SOLUTION INTRAVENOUS at 05:34

## 2024-12-25 RX ADMIN — DOCUSATE SODIUM 100 MG: 100 CAPSULE, LIQUID FILLED ORAL at 19:55

## 2024-12-25 RX ADMIN — SODIUM CHLORIDE 2.5 MILLION UNITS: 9 INJECTION, SOLUTION INTRAVENOUS at 09:31

## 2024-12-25 RX ADMIN — ONDANSETRON 4 MG: 4 TABLET, ORALLY DISINTEGRATING ORAL at 15:48

## 2024-12-25 RX ADMIN — Medication 25 MCG: at 23:41

## 2024-12-25 RX ADMIN — FAMOTIDINE 20 MG: 20 TABLET, FILM COATED ORAL at 19:55

## 2024-12-25 RX ADMIN — BUTORPHANOL TARTRATE 1 MG: 1 INJECTION, SOLUTION INTRAMUSCULAR; INTRAVENOUS at 05:35

## 2024-12-25 RX ADMIN — SODIUM CHLORIDE, POTASSIUM CHLORIDE, SODIUM LACTATE AND CALCIUM CHLORIDE 125 ML/HR: 600; 310; 30; 20 INJECTION, SOLUTION INTRAVENOUS at 09:30

## 2024-12-25 RX ADMIN — SODIUM CHLORIDE 2.5 MILLION UNITS: 9 INJECTION, SOLUTION INTRAVENOUS at 01:31

## 2024-12-25 RX ADMIN — ESCITALOPRAM OXALATE 10 MG: 10 TABLET ORAL at 11:23

## 2024-12-25 ASSESSMENT — PAIN SCALES - GENERAL: PAINLEVEL_OUTOF10: 5

## 2024-12-25 NOTE — PLAN OF CARE
Problem: Chronic Conditions and Co-morbidities  Goal: Patient's chronic conditions and co-morbidity symptoms are monitored and maintained or improved  Recent Flowsheet Documentation  Taken 12/24/2024 1934 by Sosa Morejon RN  Care Plan - Patient's Chronic Conditions and Co-Morbidity Symptoms are Monitored and Maintained or Improved:   Monitor and assess patient's chronic conditions and comorbid symptoms for stability, deterioration, or improvement   Collaborate with multidisciplinary team to address chronic and comorbid conditions and prevent exacerbation or deterioration   Update acute care plan with appropriate goals if chronic or comorbid symptoms are exacerbated and prevent overall improvement and discharge     Problem: Infection - Adult  Goal: Absence of infection during hospitalization  Recent Flowsheet Documentation  Taken 12/24/2024 1934 by Sosa Morejon RN  Absence of infection during hospitalization:   Assess and monitor for signs and symptoms of infection   Monitor lab/diagnostic results   Monitor all insertion sites i.e., indwelling lines, tubes and drains   Administer medications as ordered   Instruct and encourage patient and family to use good hand hygiene technique   Identify and instruct in appropriate isolation precautions for identified infection/condition     Problem: Discharge Planning  Goal: Discharge to home or other facility with appropriate resources  Recent Flowsheet Documentation  Taken 12/24/2024 1934 by Sosa Morejon RN  Discharge to home or other facility with appropriate resources: Identify barriers to discharge with patient and caregiver     Problem: Risk for Maternal Injury  Goal: Remains free from seizures and injury related to seizure activity  Description: INTERVENTIONS:.  -Maintain airway, patient safety and administer oxygen as ordered  -Monitor patient for seizure activity, document and report duration and descrition  -If Seizure occurs, turn patient to dide and

## 2024-12-26 LAB
GLUCOSE BLD STRIP.AUTO-MCNC: 77 MG/DL (ref 65–117)
GLUCOSE BLD STRIP.AUTO-MCNC: 78 MG/DL (ref 65–117)
GLUCOSE BLD STRIP.AUTO-MCNC: 81 MG/DL (ref 65–117)
GLUCOSE BLD STRIP.AUTO-MCNC: 86 MG/DL (ref 65–117)
GLUCOSE BLD STRIP.AUTO-MCNC: 98 MG/DL (ref 65–117)
SERVICE CMNT-IMP: NORMAL

## 2024-12-26 PROCEDURE — 6370000000 HC RX 637 (ALT 250 FOR IP): Performed by: OBSTETRICS & GYNECOLOGY

## 2024-12-26 PROCEDURE — 6360000002 HC RX W HCPCS: Performed by: OBSTETRICS & GYNECOLOGY

## 2024-12-26 PROCEDURE — 2580000003 HC RX 258: Performed by: OBSTETRICS & GYNECOLOGY

## 2024-12-26 PROCEDURE — 2500000003 HC RX 250 WO HCPCS: Performed by: ANESTHESIOLOGY

## 2024-12-26 PROCEDURE — 82962 GLUCOSE BLOOD TEST: CPT

## 2024-12-26 PROCEDURE — 1100000000 HC RM PRIVATE

## 2024-12-26 PROCEDURE — 6360000002 HC RX W HCPCS: Performed by: ANESTHESIOLOGY

## 2024-12-26 PROCEDURE — 3700000025 EPIDURAL BLOCK: Performed by: ANESTHESIOLOGY

## 2024-12-26 PROCEDURE — 2580000003 HC RX 258: Performed by: ANESTHESIOLOGY

## 2024-12-26 PROCEDURE — 7210000100 HC LABOR FEE PER 1 HR

## 2024-12-26 PROCEDURE — 51702 INSERT TEMP BLADDER CATH: CPT

## 2024-12-26 RX ORDER — CALCIUM CARBONATE 500 MG/1
1000 TABLET, CHEWABLE ORAL
Status: ACTIVE | OUTPATIENT
Start: 2024-12-26 | End: 2024-12-27

## 2024-12-26 RX ORDER — BUPIVACAINE HYDROCHLORIDE 5 MG/ML
INJECTION, SOLUTION EPIDURAL; INTRACAUDAL
Status: COMPLETED
Start: 2024-12-26 | End: 2024-12-26

## 2024-12-26 RX ORDER — TERBUTALINE SULFATE 1 MG/ML
INJECTION, SOLUTION SUBCUTANEOUS
Status: DISPENSED
Start: 2024-12-26 | End: 2024-12-27

## 2024-12-26 RX ORDER — SODIUM CHLORIDE 0.9 % (FLUSH) 0.9 %
5-40 SYRINGE (ML) INJECTION PRN
Status: DISCONTINUED | OUTPATIENT
Start: 2024-12-26 | End: 2024-12-30

## 2024-12-26 RX ORDER — SODIUM CHLORIDE, SODIUM LACTATE, POTASSIUM CHLORIDE, CALCIUM CHLORIDE 600; 310; 30; 20 MG/100ML; MG/100ML; MG/100ML; MG/100ML
INJECTION, SOLUTION INTRAVENOUS CONTINUOUS
Status: DISCONTINUED | OUTPATIENT
Start: 2024-12-26 | End: 2024-12-30 | Stop reason: HOSPADM

## 2024-12-26 RX ORDER — EPHEDRINE SULFATE 50 MG/ML
10 INJECTION INTRAVENOUS
Status: ACTIVE | OUTPATIENT
Start: 2024-12-26 | End: 2024-12-27

## 2024-12-26 RX ORDER — ACETAMINOPHEN 325 MG/1
650 TABLET ORAL EVERY 8 HOURS PRN
Status: DISCONTINUED | OUTPATIENT
Start: 2024-12-26 | End: 2024-12-30 | Stop reason: SDUPTHER

## 2024-12-26 RX ORDER — EPHEDRINE SULFATE 50 MG/ML
5 INJECTION INTRAVENOUS PRN
Status: DISCONTINUED | OUTPATIENT
Start: 2024-12-27 | End: 2024-12-27 | Stop reason: HOSPADM

## 2024-12-26 RX ORDER — SODIUM CHLORIDE 0.9 % (FLUSH) 0.9 %
5-40 SYRINGE (ML) INJECTION EVERY 12 HOURS SCHEDULED
Status: DISCONTINUED | OUTPATIENT
Start: 2024-12-26 | End: 2024-12-28

## 2024-12-26 RX ORDER — FENTANYL CITRATE 50 UG/ML
INJECTION, SOLUTION INTRAMUSCULAR; INTRAVENOUS
Status: COMPLETED
Start: 2024-12-26 | End: 2024-12-26

## 2024-12-26 RX ORDER — DIPHENHYDRAMINE HYDROCHLORIDE 50 MG/ML
25 INJECTION INTRAMUSCULAR; INTRAVENOUS EVERY 6 HOURS PRN
Status: DISCONTINUED | OUTPATIENT
Start: 2024-12-26 | End: 2024-12-27 | Stop reason: HOSPADM

## 2024-12-26 RX ORDER — SODIUM CHLORIDE 9 MG/ML
INJECTION, SOLUTION INTRAVENOUS PRN
Status: DISCONTINUED | OUTPATIENT
Start: 2024-12-26 | End: 2024-12-30 | Stop reason: SDUPTHER

## 2024-12-26 RX ORDER — LIDOCAINE HCL/EPINEPHRINE/PF 2%-1:200K
VIAL (ML) INJECTION
Status: DISCONTINUED | OUTPATIENT
Start: 2024-12-26 | End: 2024-12-27 | Stop reason: SDUPTHER

## 2024-12-26 RX ORDER — LIDOCAINE HCL/EPINEPHRINE/PF 2%-1:200K
VIAL (ML) INJECTION
Status: COMPLETED
Start: 2024-12-26 | End: 2024-12-26

## 2024-12-26 RX ORDER — BUPIVACAINE HYDROCHLORIDE 5 MG/ML
INJECTION, SOLUTION EPIDURAL; INTRACAUDAL
Status: DISCONTINUED | OUTPATIENT
Start: 2024-12-26 | End: 2024-12-27 | Stop reason: SDUPTHER

## 2024-12-26 RX ORDER — FENTANYL CITRATE 50 UG/ML
INJECTION, SOLUTION INTRAMUSCULAR; INTRAVENOUS
Status: DISCONTINUED | OUTPATIENT
Start: 2024-12-26 | End: 2024-12-27 | Stop reason: SDUPTHER

## 2024-12-26 RX ORDER — ACETAMINOPHEN 325 MG/1
650 TABLET ORAL
Status: COMPLETED | OUTPATIENT
Start: 2024-12-26 | End: 2024-12-26

## 2024-12-26 RX ORDER — FENTANYL/BUPIVACAINE/NS/PF 2-1250MCG
1-15 PLASTIC BAG, INJECTION (ML) INJECTION CONTINUOUS
Status: DISCONTINUED | OUTPATIENT
Start: 2024-12-26 | End: 2024-12-27 | Stop reason: HOSPADM

## 2024-12-26 RX ORDER — CALCIUM GLUCONATE 94 MG/ML
1000 INJECTION, SOLUTION INTRAVENOUS PRN
Status: DISCONTINUED | OUTPATIENT
Start: 2024-12-26 | End: 2024-12-30 | Stop reason: HOSPADM

## 2024-12-26 RX ORDER — NALOXONE HYDROCHLORIDE 0.4 MG/ML
INJECTION, SOLUTION INTRAMUSCULAR; INTRAVENOUS; SUBCUTANEOUS PRN
Status: DISCONTINUED | OUTPATIENT
Start: 2024-12-26 | End: 2024-12-27 | Stop reason: HOSPADM

## 2024-12-26 RX ORDER — DIPHENHYDRAMINE HCL 25 MG
25 CAPSULE ORAL EVERY 6 HOURS PRN
Status: DISCONTINUED | OUTPATIENT
Start: 2024-12-26 | End: 2024-12-27 | Stop reason: HOSPADM

## 2024-12-26 RX ADMIN — SODIUM CHLORIDE 2.5 MILLION UNITS: 9 INJECTION, SOLUTION INTRAVENOUS at 12:29

## 2024-12-26 RX ADMIN — MAGNESIUM SULFATE IN WATER FOR 4000 MG: 40 INJECTION INTRAVENOUS at 12:36

## 2024-12-26 RX ADMIN — DOCUSATE SODIUM 100 MG: 100 CAPSULE, LIQUID FILLED ORAL at 19:59

## 2024-12-26 RX ADMIN — ACETAMINOPHEN 650 MG: 325 TABLET ORAL at 22:18

## 2024-12-26 RX ADMIN — ACETAMINOPHEN 650 MG: 325 TABLET ORAL at 16:56

## 2024-12-26 RX ADMIN — BUPIVACAINE HYDROCHLORIDE 5 ML: 5 INJECTION, SOLUTION EPIDURAL; INTRACAUDAL; PERINEURAL at 11:46

## 2024-12-26 RX ADMIN — FAMOTIDINE 20 MG: 20 TABLET, FILM COATED ORAL at 19:59

## 2024-12-26 RX ADMIN — SODIUM CHLORIDE 2.5 MILLION UNITS: 9 INJECTION, SOLUTION INTRAVENOUS at 16:16

## 2024-12-26 RX ADMIN — Medication 25 MCG: at 03:56

## 2024-12-26 RX ADMIN — SODIUM CHLORIDE 2.5 MILLION UNITS: 9 INJECTION, SOLUTION INTRAVENOUS at 20:01

## 2024-12-26 RX ADMIN — ESCITALOPRAM OXALATE 10 MG: 10 TABLET ORAL at 09:06

## 2024-12-26 RX ADMIN — SODIUM CHLORIDE 2.5 MILLION UNITS: 9 INJECTION, SOLUTION INTRAVENOUS at 23:57

## 2024-12-26 RX ADMIN — FENTANYL CITRATE 100 MCG: 50 INJECTION INTRAMUSCULAR; INTRAVENOUS at 11:46

## 2024-12-26 RX ADMIN — OXYTOCIN 1 MILLI-UNITS/MIN: 10 INJECTION, SOLUTION INTRAMUSCULAR; INTRAVENOUS at 08:11

## 2024-12-26 RX ADMIN — SODIUM CHLORIDE 2.5 MILLION UNITS: 9 INJECTION, SOLUTION INTRAVENOUS at 08:12

## 2024-12-26 RX ADMIN — Medication 10 ML/HR: at 20:29

## 2024-12-26 RX ADMIN — Medication 10 ML/HR: at 11:57

## 2024-12-26 RX ADMIN — SODIUM CHLORIDE, POTASSIUM CHLORIDE, SODIUM LACTATE AND CALCIUM CHLORIDE: 600; 310; 30; 20 INJECTION, SOLUTION INTRAVENOUS at 11:48

## 2024-12-26 RX ADMIN — MAGNESIUM SULFATE HEPTAHYDRATE 2000 MG/HR: 40 INJECTION, SOLUTION INTRAVENOUS at 23:10

## 2024-12-26 RX ADMIN — FAMOTIDINE 20 MG: 20 TABLET, FILM COATED ORAL at 09:05

## 2024-12-26 RX ADMIN — LIDOCAINE HYDROCHLORIDE,EPINEPHRINE BITARTRATE 5 ML: 20; .005 INJECTION, SOLUTION EPIDURAL; INFILTRATION; INTRACAUDAL; PERINEURAL at 11:43

## 2024-12-26 RX ADMIN — MAGNESIUM SULFATE HEPTAHYDRATE 2000 MG/HR: 40 INJECTION, SOLUTION INTRAVENOUS at 12:56

## 2024-12-26 RX ADMIN — DOCUSATE SODIUM 100 MG: 100 CAPSULE, LIQUID FILLED ORAL at 09:05

## 2024-12-26 ASSESSMENT — PAIN SCALES - GENERAL
PAINLEVEL_OUTOF10: 3
PAINLEVEL_OUTOF10: 3

## 2024-12-26 NOTE — ANESTHESIA PRE PROCEDURE
Department of Anesthesiology  Preprocedure Note       Name:  Jennifer Nassar   Age:  39 y.o.  :  1985                                          MRN:  [id number]         Date:  2024      Surgeon: * No surgeons listed *    Procedure: * No procedures listed *    Medications prior to admission:   Prior to Admission medications    Medication Sig Start Date End Date Taking? Authorizing Provider   escitalopram (LEXAPRO) 10 MG tablet Take 1 tablet by mouth daily   Yes Donna Roland MD   aspirin 81 MG EC tablet Take 1 tablet by mouth daily   Yes Donna Roland MD   Prenatal Vit w/Jh-Yzpybaacq-PD (PNV PO) Take by mouth   Yes Donna Roland MD   blood glucose test strips (EXACTECH TEST) strip 1 each by In Vitro route 4 times daily As needed. 24   Suly Everett MD       Current medications:    Current Facility-Administered Medications   Medication Dose Route Frequency Provider Last Rate Last Admin    calcium carbonate (TUMS) chewable tablet 1,000 mg  1,000 mg Oral Once PRN Sixto Rainey MD        fentaNYL 2 mcg/mL BUPivacaine 0.125% in sodium chloride 0.9% 100 mL epidural infusion  1-15 mL/hr Epidural Continuous Michael Mejia MD        naloxone 0.4 mg in 10 mL sodium chloride syringe   IntraVENous PRN Michael Mejia MD        ePHEDrine injection 10 mg  10 mg IntraVENous Once PRN Michael Mejia MD        Followed by    [START ON 2024] ePHEDrine injection 5 mg  5 mg IntraVENous PRN Michael Mejia MD        diphenhydrAMINE (BENADRYL) injection 25 mg  25 mg IntraVENous Q6H PRN Michael Mejia MD        Or    diphenhydrAMINE (BENADRYL) capsule 25 mg  25 mg Oral Q6H PRN Michael Mejia MD        escitalopram (LEXAPRO) tablet 10 mg  10 mg Oral Daily Sixto Rainey MD   10 mg at 24 0906    butorphanol (STADOL) injection 1 mg  1 mg IntraVENous Q3H PRN Natalie Zavala MD   1 mg at 24 0535    oxytocin (PITOCIN) 30 units in 500 mL infusion  1-20 maximiliano-units/min

## 2024-12-26 NOTE — ANESTHESIA PROCEDURE NOTES
Epidural Block    Patient location during procedure: OB  Start time: 12/26/2024 11:35 AM  End time: 12/26/2024 11:47 AM  Reason for block: labor epidural  Staffing  Performed: anesthesiologist   Anesthesiologist: Michael Mejia MD  Performed by: Michael Mejia MD  Authorized by: Michael Mejia MD    Epidural  Patient position: sitting  Prep: DuraPrep  Patient monitoring: cardiac monitor, continuous pulse ox and frequent blood pressure checks  Approach: midline  Location: L3-4  Injection technique: MINI air  Provider prep: mask and sterile gloves  Needle  Needle type: Tuohy   Needle gauge: 17 G  Needle length: 3.5 in  Needle insertion depth: 8 cm  Catheter type: end hole  Catheter size: 18 G  Catheter at skin depth: 12 cmCatheter Secured: tegaderm and tape  Assessment  Sensory level: T10  Events: None  Hemodynamics: stable  Attempts: 2  Outcomes: uncomplicated and patient tolerated procedure well  Preanesthetic Checklist  Completed: patient identified, IV checked, site marked, risks and benefits discussed, surgical/procedural consents, equipment checked, pre-op evaluation, timeout performed, anesthesia consent given, oxygen available, monitors applied/VS acknowledged and fire risk safety assessment completed and verbalized

## 2024-12-27 LAB
GLUCOSE BLD STRIP.AUTO-MCNC: 101 MG/DL (ref 65–117)
GLUCOSE BLD STRIP.AUTO-MCNC: 112 MG/DL (ref 65–117)
GLUCOSE BLD STRIP.AUTO-MCNC: 95 MG/DL (ref 65–117)
SERVICE CMNT-IMP: NORMAL

## 2024-12-27 PROCEDURE — 6360000002 HC RX W HCPCS: Performed by: ANESTHESIOLOGY

## 2024-12-27 PROCEDURE — 2580000003 HC RX 258: Performed by: OBSTETRICS & GYNECOLOGY

## 2024-12-27 PROCEDURE — 3700000000 HC ANESTHESIA ATTENDED CARE: Performed by: OBSTETRICS & GYNECOLOGY

## 2024-12-27 PROCEDURE — 82962 GLUCOSE BLOOD TEST: CPT

## 2024-12-27 PROCEDURE — 7100000000 HC PACU RECOVERY - FIRST 15 MIN: Performed by: OBSTETRICS & GYNECOLOGY

## 2024-12-27 PROCEDURE — 6360000002 HC RX W HCPCS: Performed by: NURSE ANESTHETIST, CERTIFIED REGISTERED

## 2024-12-27 PROCEDURE — 7210000100 HC LABOR FEE PER 1 HR

## 2024-12-27 PROCEDURE — 7100000001 HC PACU RECOVERY - ADDTL 15 MIN: Performed by: OBSTETRICS & GYNECOLOGY

## 2024-12-27 PROCEDURE — 94760 N-INVAS EAR/PLS OXIMETRY 1: CPT

## 2024-12-27 PROCEDURE — 6360000002 HC RX W HCPCS: Performed by: OBSTETRICS & GYNECOLOGY

## 2024-12-27 PROCEDURE — 6370000000 HC RX 637 (ALT 250 FOR IP): Performed by: OBSTETRICS & GYNECOLOGY

## 2024-12-27 PROCEDURE — 2580000003 HC RX 258: Performed by: ANESTHESIOLOGY

## 2024-12-27 PROCEDURE — 1120000000 HC RM PRIVATE OB

## 2024-12-27 PROCEDURE — 2709999900 HC NON-CHARGEABLE SUPPLY: Performed by: OBSTETRICS & GYNECOLOGY

## 2024-12-27 PROCEDURE — 3609079900 HC CESAREAN SECTION: Performed by: OBSTETRICS & GYNECOLOGY

## 2024-12-27 PROCEDURE — 2500000003 HC RX 250 WO HCPCS: Performed by: OBSTETRICS & GYNECOLOGY

## 2024-12-27 PROCEDURE — 3700000001 HC ADD 15 MINUTES (ANESTHESIA): Performed by: OBSTETRICS & GYNECOLOGY

## 2024-12-27 PROCEDURE — 2580000003 HC RX 258: Performed by: NURSE ANESTHETIST, CERTIFIED REGISTERED

## 2024-12-27 RX ORDER — BUPIVACAINE HYDROCHLORIDE 2.5 MG/ML
INJECTION, SOLUTION EPIDURAL; INFILTRATION; INTRACAUDAL
Status: DISCONTINUED | OUTPATIENT
Start: 2024-12-27 | End: 2024-12-27 | Stop reason: SDUPTHER

## 2024-12-27 RX ORDER — ONDANSETRON 2 MG/ML
4 INJECTION INTRAMUSCULAR; INTRAVENOUS EVERY 6 HOURS PRN
Status: DISCONTINUED | OUTPATIENT
Start: 2024-12-27 | End: 2024-12-30 | Stop reason: HOSPADM

## 2024-12-27 RX ORDER — SODIUM CHLORIDE 9 MG/ML
INJECTION, SOLUTION INTRAVENOUS PRN
Status: DISCONTINUED | OUTPATIENT
Start: 2024-12-27 | End: 2024-12-30 | Stop reason: HOSPADM

## 2024-12-27 RX ORDER — SODIUM CHLORIDE 0.9 % (FLUSH) 0.9 %
5-40 SYRINGE (ML) INJECTION EVERY 12 HOURS SCHEDULED
Status: DISCONTINUED | OUTPATIENT
Start: 2024-12-27 | End: 2024-12-30 | Stop reason: HOSPADM

## 2024-12-27 RX ORDER — ONDANSETRON 2 MG/ML
INJECTION INTRAMUSCULAR; INTRAVENOUS
Status: DISCONTINUED | OUTPATIENT
Start: 2024-12-27 | End: 2024-12-27 | Stop reason: SDUPTHER

## 2024-12-27 RX ORDER — ONDANSETRON 4 MG/1
4 TABLET, ORALLY DISINTEGRATING ORAL EVERY 8 HOURS PRN
Status: DISCONTINUED | OUTPATIENT
Start: 2024-12-27 | End: 2024-12-30 | Stop reason: HOSPADM

## 2024-12-27 RX ORDER — FENTANYL CITRATE 50 UG/ML
INJECTION, SOLUTION INTRAMUSCULAR; INTRAVENOUS
Status: COMPLETED
Start: 2024-12-27 | End: 2024-12-27

## 2024-12-27 RX ORDER — SODIUM CHLORIDE 0.9 % (FLUSH) 0.9 %
5-40 SYRINGE (ML) INJECTION PRN
Status: DISCONTINUED | OUTPATIENT
Start: 2024-12-27 | End: 2024-12-30 | Stop reason: HOSPADM

## 2024-12-27 RX ORDER — ACETAMINOPHEN 500 MG
1000 TABLET ORAL EVERY 8 HOURS SCHEDULED
Status: DISCONTINUED | OUTPATIENT
Start: 2024-12-27 | End: 2024-12-30 | Stop reason: HOSPADM

## 2024-12-27 RX ORDER — DOCUSATE SODIUM 100 MG/1
100 CAPSULE, LIQUID FILLED ORAL 2 TIMES DAILY
Status: DISCONTINUED | OUTPATIENT
Start: 2024-12-27 | End: 2024-12-27 | Stop reason: SDUPTHER

## 2024-12-27 RX ORDER — KETOROLAC TROMETHAMINE 30 MG/ML
INJECTION, SOLUTION INTRAMUSCULAR; INTRAVENOUS
Status: DISCONTINUED | OUTPATIENT
Start: 2024-12-27 | End: 2024-12-27 | Stop reason: SDUPTHER

## 2024-12-27 RX ORDER — SODIUM CHLORIDE, SODIUM LACTATE, POTASSIUM CHLORIDE, CALCIUM CHLORIDE 600; 310; 30; 20 MG/100ML; MG/100ML; MG/100ML; MG/100ML
INJECTION, SOLUTION INTRAVENOUS
Status: DISCONTINUED | OUTPATIENT
Start: 2024-12-27 | End: 2024-12-27 | Stop reason: SDUPTHER

## 2024-12-27 RX ORDER — MODIFIED LANOLIN
OINTMENT (GRAM) TOPICAL
Status: DISCONTINUED | OUTPATIENT
Start: 2024-12-27 | End: 2024-12-30 | Stop reason: HOSPADM

## 2024-12-27 RX ORDER — MORPHINE SULFATE 1 MG/ML
INJECTION, SOLUTION EPIDURAL; INTRATHECAL; INTRAVENOUS
Status: DISCONTINUED | OUTPATIENT
Start: 2024-12-27 | End: 2024-12-27

## 2024-12-27 RX ORDER — KETOROLAC TROMETHAMINE 30 MG/ML
30 INJECTION, SOLUTION INTRAMUSCULAR; INTRAVENOUS EVERY 6 HOURS
Status: DISPENSED | OUTPATIENT
Start: 2024-12-27 | End: 2024-12-28

## 2024-12-27 RX ORDER — BUPIVACAINE HYDROCHLORIDE 2.5 MG/ML
INJECTION, SOLUTION EPIDURAL; INFILTRATION; INTRACAUDAL
Status: COMPLETED
Start: 2024-12-27 | End: 2024-12-27

## 2024-12-27 RX ORDER — ACETAMINOPHEN 325 MG/1
975 TABLET ORAL ONCE
Status: COMPLETED | OUTPATIENT
Start: 2024-12-27 | End: 2024-12-27

## 2024-12-27 RX ORDER — IBUPROFEN 400 MG/1
800 TABLET, FILM COATED ORAL EVERY 8 HOURS
Status: DISCONTINUED | OUTPATIENT
Start: 2024-12-28 | End: 2024-12-30 | Stop reason: HOSPADM

## 2024-12-27 RX ORDER — OXYTOCIN 10 [USP'U]/ML
INJECTION, SOLUTION INTRAMUSCULAR; INTRAVENOUS PRN
Status: DISCONTINUED | OUTPATIENT
Start: 2024-12-27 | End: 2024-12-27 | Stop reason: HOSPADM

## 2024-12-27 RX ORDER — LIDOCAINE HCL/EPINEPHRINE/PF 2%-1:200K
VIAL (ML) INJECTION
Status: COMPLETED
Start: 2024-12-27 | End: 2024-12-27

## 2024-12-27 RX ORDER — MORPHINE SULFATE 1 MG/ML
INJECTION, SOLUTION EPIDURAL; INTRATHECAL; INTRAVENOUS
Status: DISCONTINUED | OUTPATIENT
Start: 2024-12-27 | End: 2024-12-27 | Stop reason: SDUPTHER

## 2024-12-27 RX ADMIN — Medication 10 ML/HR: at 03:08

## 2024-12-27 RX ADMIN — FENTANYL CITRATE 100 MCG: 50 INJECTION INTRAMUSCULAR; INTRAVENOUS at 10:42

## 2024-12-27 RX ADMIN — DOCUSATE SODIUM 100 MG: 100 CAPSULE, LIQUID FILLED ORAL at 20:42

## 2024-12-27 RX ADMIN — BUPIVACAINE HYDROCHLORIDE 5 ML: 2.5 INJECTION, SOLUTION EPIDURAL; INFILTRATION; INTRACAUDAL; PERINEURAL at 04:58

## 2024-12-27 RX ADMIN — SODIUM CHLORIDE, POTASSIUM CHLORIDE, SODIUM LACTATE AND CALCIUM CHLORIDE: 600; 310; 30; 20 INJECTION, SOLUTION INTRAVENOUS at 10:14

## 2024-12-27 RX ADMIN — KETOROLAC TROMETHAMINE 30 MG: 30 INJECTION, SOLUTION INTRAMUSCULAR at 11:14

## 2024-12-27 RX ADMIN — SODIUM CHLORIDE 2.5 MILLION UNITS: 9 INJECTION, SOLUTION INTRAVENOUS at 03:48

## 2024-12-27 RX ADMIN — MAGNESIUM SULFATE HEPTAHYDRATE 2000 MG/HR: 40 INJECTION, SOLUTION INTRAVENOUS at 21:34

## 2024-12-27 RX ADMIN — FAMOTIDINE 20 MG: 10 INJECTION, SOLUTION INTRAVENOUS at 09:48

## 2024-12-27 RX ADMIN — MAGNESIUM SULFATE HEPTAHYDRATE 2000 MG/HR: 40 INJECTION, SOLUTION INTRAVENOUS at 11:48

## 2024-12-27 RX ADMIN — ACETAMINOPHEN 975 MG: 325 TABLET ORAL at 09:26

## 2024-12-27 RX ADMIN — MAGNESIUM SULFATE HEPTAHYDRATE 2000 MG/HR: 40 INJECTION, SOLUTION INTRAVENOUS at 09:02

## 2024-12-27 RX ADMIN — MORPHINE SULFATE 3 MG: 1 INJECTION EPIDURAL; INTRATHECAL; INTRAVENOUS at 10:42

## 2024-12-27 RX ADMIN — FAMOTIDINE 20 MG: 20 TABLET, FILM COATED ORAL at 20:42

## 2024-12-27 RX ADMIN — LIDOCAINE HYDROCHLORIDE,EPINEPHRINE BITARTRATE 5 ML: 20; .005 INJECTION, SOLUTION EPIDURAL; INFILTRATION; INTRACAUDAL; PERINEURAL at 09:55

## 2024-12-27 RX ADMIN — ACETAMINOPHEN 1000 MG: 500 TABLET ORAL at 20:42

## 2024-12-27 RX ADMIN — WATER 2000 MG: 1 INJECTION INTRAMUSCULAR; INTRAVENOUS; SUBCUTANEOUS at 10:06

## 2024-12-27 RX ADMIN — PHENYLEPHRINE HYDROCHLORIDE 80 MCG/MIN: 10 INJECTION INTRAVENOUS at 10:15

## 2024-12-27 RX ADMIN — LIDOCAINE HYDROCHLORIDE,EPINEPHRINE BITARTRATE 5 ML: 20; .005 INJECTION, SOLUTION EPIDURAL; INFILTRATION; INTRACAUDAL; PERINEURAL at 09:52

## 2024-12-27 RX ADMIN — FENTANYL CITRATE 100 MCG: 50 INJECTION INTRAMUSCULAR; INTRAVENOUS at 03:10

## 2024-12-27 RX ADMIN — SODIUM CHLORIDE, SODIUM LACTATE, POTASSIUM CHLORIDE, AND CALCIUM CHLORIDE: .6; .31; .03; .02 INJECTION, SOLUTION INTRAVENOUS at 23:40

## 2024-12-27 RX ADMIN — AZITHROMYCIN MONOHYDRATE 500 MG: 500 INJECTION, POWDER, LYOPHILIZED, FOR SOLUTION INTRAVENOUS at 10:20

## 2024-12-27 RX ADMIN — LIDOCAINE HYDROCHLORIDE,EPINEPHRINE BITARTRATE 5 ML: 20; .005 INJECTION, SOLUTION EPIDURAL; INFILTRATION; INTRACAUDAL; PERINEURAL at 10:06

## 2024-12-27 RX ADMIN — BUPIVACAINE HYDROCHLORIDE 5 ML: 2.5 INJECTION, SOLUTION EPIDURAL; INFILTRATION; INTRACAUDAL; PERINEURAL at 03:10

## 2024-12-27 RX ADMIN — KETOROLAC TROMETHAMINE 30 MG: 30 INJECTION, SOLUTION INTRAMUSCULAR at 18:19

## 2024-12-27 RX ADMIN — Medication 10 ML/HR: at 07:26

## 2024-12-27 RX ADMIN — SODIUM CHLORIDE, POTASSIUM CHLORIDE, SODIUM LACTATE AND CALCIUM CHLORIDE: 600; 310; 30; 20 INJECTION, SOLUTION INTRAVENOUS at 11:05

## 2024-12-27 RX ADMIN — ONDANSETRON 4 MG: 2 INJECTION INTRAMUSCULAR; INTRAVENOUS at 10:15

## 2024-12-27 ASSESSMENT — PAIN DESCRIPTION - DESCRIPTORS: DESCRIPTORS: DISCOMFORT

## 2024-12-27 ASSESSMENT — PAIN DESCRIPTION - ORIENTATION: ORIENTATION: ANTERIOR;LOWER

## 2024-12-27 ASSESSMENT — PAIN DESCRIPTION - LOCATION: LOCATION: ABDOMEN

## 2024-12-27 ASSESSMENT — PAIN SCALES - GENERAL: PAINLEVEL_OUTOF10: 1

## 2024-12-27 NOTE — L&D DELIVERY NOTE
JUAN Nassar [[id number]]      Labor Events     Labor: No   Steroids: None  Cervical Ripening Date/Time:  24    Cervical Ripening Type: Misoprostol, Steven/EASI  Rupture Date/Time:  24 12:12:00   Rupture Type: AROM  Fluid Color: Clear  Fluid Odor: None  Induction: Misoprostol, Steven Bulb (Balloon), Oxytocin  Augmentation: AROM  Labor Complications: Failure to Progress in First Stage       Anesthesia    Method: Epidural       Labor Event Times      Labor onset date/time:  24 12:30:00     Dilation complete date/time:        Start pushing date/time:     Decision date/time (emergent ):            Delivery Details      Delivery Date: 24 Delivery Time: 10:41:00   Delivery Type: , Low Transverse  Trial of Labor?: Yes   Categorization: Primary   Priority: unscheduled   Other  Indications:  PROCEDURAL - OBSTETRIC - DELIVERY -  SECTION - INDICATIONS FOR  SECTION   Maternal request      Skin Incision Type: Pfannenstiel  Uterine Incision: Low Transverse        Presentation    Presentation: Vertex       Shoulder Dystocia    Shoulder Dystocia Present?: No       Assisted Delivery Details    Forceps Attempted?: No  Vacuum Extractor Attempted?: No                           Cord    Vessels: 3 Vessels  Complications: Nuchal Loose  Cord Around: Head  Delayed Cord Clamping?: No  Cord Clamped Date/Time: 2024 10:42:00  Cord Blood Disposition: Lab  Gases Sent?: No              Placenta    Date/Time: 2024 10:42:00  Removal: Expressed  Appearance: Intact  Disposition: Discarded       Lacerations           Blood Loss  Mother: Cesario Jennifer #[id number]     Start of Mother's Information      Delivery Blood Loss   Intrapartum & Postpartum: 24 1230 - 24 1124    Delivery Admission: 24 1406 - 24 1124         Intrapartum & Postpartum Delivery Admission    None                  End of Mother's Information

## 2024-12-27 NOTE — ANESTHESIA POSTPROCEDURE EVALUATION
Department of Anesthesiology  Postprocedure Note    Patient: Jennifer Nassar  MRN: [id number]  YOB: 1985  Date of evaluation: 2024    Procedure Summary       Date: 24 Room / Location: [facility] L&D OR    Anesthesia Start:  Anesthesia Stop: 24 1138    Procedures:        SECTION (Abdomen)      Labor Analgesia Diagnosis: (No Labor - Maternal Request)    Surgeons: Natalie Zavala MD Responsible Provider: Jose Alfredo Mock MD    Anesthesia Type: Regional, Epidural ASA Status: 2            Anesthesia Type: Regional, Epidural    Bladimir Phase I:      Bladimir Phase II:      Anesthesia Post Evaluation    Patient location during evaluation: PACU  Patient participation: complete - patient participated  Level of consciousness: awake  Airway patency: patent  Nausea & Vomiting: no nausea  Cardiovascular status: blood pressure returned to baseline and hemodynamically stable  Respiratory status: acceptable  Hydration status: stable  Multimodal analgesia pain management approach    No notable events documented.

## 2024-12-27 NOTE — OP NOTE
MRN: [id number]    PATIENT NAME: Jennifer Nassar    YOB: 1985    SURGEON: Natalie Zavala MD    FIRST ASSISTANT: Jolynn Palafox, RN       SERVICE: OBSTETRICS/GYNECOLOGY    PREOPERATIVE DIAGNOSIS: Maternal request for , active phase of labor, Induction for pre eclampsia with severe features, GDMA1, AMA, obesity    POSTOPERATIVE DIAGNOSIS: Same    ABX: ancef and azithromycin    OPERATIVE PROCEDURE: Primary low transverse  section, partial removal of omentum    ANESTHESIA: epidural    Complications none    FINDINGS: Normal uterus, ovaries, fallopian tubes. LFI infant, APGARs 5/7/8. Adhesion of fundus of uterus to the omentum sheered during delivery, hematoma noted at the end of the omentum which was removed. Subcutaneous labor approximately 6cm in depth.      ESTIMATED BLOOD LOSS: 800cc    IV pit and IU pitocin intraoperatively    INDICATIONS: The patient was appropriately counseled, and once informed consent was obtained, she was taken to the operating room.    PROCEDURE: The patient was taken to the operating room, an epidural was redosed by anesthesiology, and the patient was placed in the dorsal supine position with a left lateral tilt. She was prepped and draped in the usual sterile fashion. A Pfannenstiel skin incision was made 2 finger breadths above the pubic symphysis and taken down to the level of the fascia with a scalpel. The fascia was incised bilaterally on either side of the midline. The fascial incision was extended bilaterally with Rajput scissors. The superior aspect of the fascia was grasped with Kocher clamps, tented up, and the rectus muscles were dissected off of the fascia bluntly and with the aid of rajput scissors. Thereafter, the inferior aspect of the fascia was grasped with Kocher clamps and the rectus muscles were dissected off to the level of the pubic bone. The peritoneum was accessed bluntly with our fingers. The lower uterine segment was identified, noted to be

## 2024-12-28 LAB
ALBUMIN SERPL-MCNC: 2 G/DL (ref 3.5–5)
ALBUMIN/GLOB SERPL: 0.5 (ref 1.1–2.2)
ALP SERPL-CCNC: 160 U/L (ref 45–117)
ALT SERPL-CCNC: 87 U/L (ref 12–78)
ANION GAP SERPL CALC-SCNC: 7 MMOL/L (ref 2–12)
AST SERPL-CCNC: 24 U/L (ref 15–37)
BILIRUB SERPL-MCNC: 0.4 MG/DL (ref 0.2–1)
BUN SERPL-MCNC: 10 MG/DL (ref 6–20)
BUN/CREAT SERPL: 13 (ref 12–20)
CALCIUM SERPL-MCNC: 7.1 MG/DL (ref 8.5–10.1)
CHLORIDE SERPL-SCNC: 104 MMOL/L (ref 97–108)
CO2 SERPL-SCNC: 21 MMOL/L (ref 21–32)
CREAT SERPL-MCNC: 0.8 MG/DL (ref 0.55–1.02)
ERYTHROCYTE [DISTWIDTH] IN BLOOD BY AUTOMATED COUNT: 14.6 % (ref 11.5–14.5)
GLOBULIN SER CALC-MCNC: 3.8 G/DL (ref 2–4)
GLUCOSE SERPL-MCNC: 100 MG/DL (ref 65–100)
HCT VFR BLD AUTO: 32.8 % (ref 35–47)
HGB BLD-MCNC: 11.2 G/DL (ref 11.5–16)
MCH RBC QN AUTO: 30 PG (ref 26–34)
MCHC RBC AUTO-ENTMCNC: 34.1 G/DL (ref 30–36.5)
MCV RBC AUTO: 87.9 FL (ref 80–99)
NRBC # BLD: 0 K/UL (ref 0–0.01)
NRBC BLD-RTO: 0 PER 100 WBC
PLATELET # BLD AUTO: 168 K/UL (ref 150–400)
PMV BLD AUTO: 11.8 FL (ref 8.9–12.9)
POTASSIUM SERPL-SCNC: 4.2 MMOL/L (ref 3.5–5.1)
PROT SERPL-MCNC: 5.8 G/DL (ref 6.4–8.2)
RBC # BLD AUTO: 3.73 M/UL (ref 3.8–5.2)
SODIUM SERPL-SCNC: 132 MMOL/L (ref 136–145)
WBC # BLD AUTO: 14 K/UL (ref 3.6–11)

## 2024-12-28 PROCEDURE — 6370000000 HC RX 637 (ALT 250 FOR IP): Performed by: OBSTETRICS & GYNECOLOGY

## 2024-12-28 PROCEDURE — 2500000003 HC RX 250 WO HCPCS: Performed by: OBSTETRICS & GYNECOLOGY

## 2024-12-28 PROCEDURE — 6360000002 HC RX W HCPCS: Performed by: OBSTETRICS & GYNECOLOGY

## 2024-12-28 PROCEDURE — 80053 COMPREHEN METABOLIC PANEL: CPT

## 2024-12-28 PROCEDURE — 36415 COLL VENOUS BLD VENIPUNCTURE: CPT

## 2024-12-28 PROCEDURE — 1120000000 HC RM PRIVATE OB

## 2024-12-28 PROCEDURE — 85027 COMPLETE CBC AUTOMATED: CPT

## 2024-12-28 RX ORDER — OXYCODONE HYDROCHLORIDE 5 MG/1
5 TABLET ORAL EVERY 4 HOURS PRN
Status: DISCONTINUED | OUTPATIENT
Start: 2024-12-28 | End: 2024-12-30 | Stop reason: HOSPADM

## 2024-12-28 RX ORDER — OXYCODONE HYDROCHLORIDE 5 MG/1
10 TABLET ORAL EVERY 6 HOURS PRN
Status: DISCONTINUED | OUTPATIENT
Start: 2024-12-28 | End: 2024-12-30 | Stop reason: HOSPADM

## 2024-12-28 RX ADMIN — FAMOTIDINE 20 MG: 20 TABLET, FILM COATED ORAL at 09:32

## 2024-12-28 RX ADMIN — DOCUSATE SODIUM 100 MG: 100 CAPSULE, LIQUID FILLED ORAL at 09:32

## 2024-12-28 RX ADMIN — IBUPROFEN 800 MG: 400 TABLET, FILM COATED ORAL at 12:58

## 2024-12-28 RX ADMIN — IBUPROFEN 800 MG: 400 TABLET, FILM COATED ORAL at 20:50

## 2024-12-28 RX ADMIN — ACETAMINOPHEN 1000 MG: 500 TABLET ORAL at 05:06

## 2024-12-28 RX ADMIN — SODIUM CHLORIDE, PRESERVATIVE FREE 10 ML: 5 INJECTION INTRAVENOUS at 20:52

## 2024-12-28 RX ADMIN — KETOROLAC TROMETHAMINE 30 MG: 30 INJECTION, SOLUTION INTRAMUSCULAR at 06:30

## 2024-12-28 RX ADMIN — DOCUSATE SODIUM 100 MG: 100 CAPSULE, LIQUID FILLED ORAL at 20:51

## 2024-12-28 RX ADMIN — OXYCODONE HYDROCHLORIDE 5 MG: 5 TABLET ORAL at 17:21

## 2024-12-28 RX ADMIN — ESCITALOPRAM OXALATE 10 MG: 10 TABLET ORAL at 09:33

## 2024-12-28 RX ADMIN — FAMOTIDINE 20 MG: 20 TABLET, FILM COATED ORAL at 20:51

## 2024-12-28 RX ADMIN — KETOROLAC TROMETHAMINE 30 MG: 30 INJECTION, SOLUTION INTRAMUSCULAR at 00:12

## 2024-12-28 RX ADMIN — ACETAMINOPHEN 1000 MG: 500 TABLET ORAL at 17:13

## 2024-12-28 ASSESSMENT — PAIN SCALES - GENERAL
PAINLEVEL_OUTOF10: 3
PAINLEVEL_OUTOF10: 4

## 2024-12-28 ASSESSMENT — PAIN DESCRIPTION - DESCRIPTORS
DESCRIPTORS: ACHING

## 2024-12-28 ASSESSMENT — PAIN DESCRIPTION - LOCATION
LOCATION: ABDOMEN

## 2024-12-28 ASSESSMENT — PAIN DESCRIPTION - ORIENTATION
ORIENTATION: LOWER

## 2024-12-28 NOTE — DISCHARGE INSTRUCTIONS
Postpartum Support Groups   We know that all of us are dealing with a tremendous amount of uncertainty, confusion and disruption to our daily lives, which may result in increased anxiety, depression and fear. If you are feeling unsettled or worse, please know that we are here to help. During this time of increased caution and care for one another, [facility] [place] ([facility]) is offering virtual and in person support groups to ALL MOTHERS in [place] regardless of the age of your child/children as a way to help weather this emotional storm together. Social support is an important part of self-care during this time of physical distancing.  Virtual postpartum support group meetings available at www.[facility].org  Warm Line: 214.114.1804    Breastfeeding Support Groups    and  of each month at [facility] [place]   and  of each month at [facility] (in education center behind cafeteria)    [email or web address]      After Your Delivery Discharge Instructions    After Discharge Orders:  No future appointments.       Medical equipment: Breast pump     Call the Physician with any  signs and symptoms:    Warning signs regarding incision:  \"Popping\" of stitches or staples  Foul smelling discharge or pus  More redness or streaks around incision than before    Incision care:  Keep incision uncovered  Clean incision twice daily once dressing is removed  No tub baths until OK'd by your Physician or Midwife  You may use cold compresses on incision site     After your delivery - signs and symptoms to watch for:  Fever - Oral temperature greater than 100.4 degrees Fahrenheit  Foul-smelling vaginal discharge  Headache unrelieved by \"pain meds\"  Difficulty urinating  Breasts reddened, hard, hot to the touch  Nipple discharge which is foul-smelling or contains pus  Increased pain at the site of the surgical incision  Difficulty breathing

## 2024-12-28 NOTE — LACTATION NOTE
This note was copied from a baby's chart.  Infant born via C/S yesterday morning to a  mom at 37 2/7 weeks gestation. Mom has been both breast and formula feeding infant. At this visit, I assisted mom with waking infant and latching in the football position. Infant was eager and latched deeply with rhythmic sucks and occasional swallows noted.  Encouraged breast massage as infant nurses.     Feeding Plan:  Mother will keep baby skin to skin as often as possible, feed on demand, 8-12x/day , respond to feeding cues, obtain latch, listen for audible swallowing, be aware of signs of oxytocin release/ cramping,thirst,sleepiness while breastfeeding, offer both breasts,and will not limit feedings.  Mother agrees to utilize breast massage while nursing to facilitate lactogenesis.

## 2024-12-29 PROCEDURE — 1120000000 HC RM PRIVATE OB

## 2024-12-29 PROCEDURE — 6370000000 HC RX 637 (ALT 250 FOR IP): Performed by: OBSTETRICS & GYNECOLOGY

## 2024-12-29 RX ADMIN — IBUPROFEN 800 MG: 400 TABLET, FILM COATED ORAL at 20:32

## 2024-12-29 RX ADMIN — FAMOTIDINE 20 MG: 20 TABLET, FILM COATED ORAL at 20:32

## 2024-12-29 RX ADMIN — ESCITALOPRAM OXALATE 10 MG: 10 TABLET ORAL at 08:42

## 2024-12-29 RX ADMIN — DOCUSATE SODIUM 100 MG: 100 CAPSULE, LIQUID FILLED ORAL at 20:32

## 2024-12-29 RX ADMIN — OXYCODONE HYDROCHLORIDE 5 MG: 5 TABLET ORAL at 08:47

## 2024-12-29 RX ADMIN — ACETAMINOPHEN 1000 MG: 500 TABLET ORAL at 08:42

## 2024-12-29 RX ADMIN — IBUPROFEN 800 MG: 400 TABLET, FILM COATED ORAL at 04:31

## 2024-12-29 RX ADMIN — DOCUSATE SODIUM 100 MG: 100 CAPSULE, LIQUID FILLED ORAL at 08:42

## 2024-12-29 RX ADMIN — ACETAMINOPHEN 1000 MG: 500 TABLET ORAL at 01:39

## 2024-12-29 RX ADMIN — OXYCODONE HYDROCHLORIDE 5 MG: 5 TABLET ORAL at 17:40

## 2024-12-29 RX ADMIN — FAMOTIDINE 20 MG: 20 TABLET, FILM COATED ORAL at 08:42

## 2024-12-29 RX ADMIN — IBUPROFEN 800 MG: 400 TABLET, FILM COATED ORAL at 12:38

## 2024-12-29 RX ADMIN — ACETAMINOPHEN 1000 MG: 500 TABLET ORAL at 17:40

## 2024-12-29 ASSESSMENT — PAIN SCALES - GENERAL
PAINLEVEL_OUTOF10: 4
PAINLEVEL_OUTOF10: 6
PAINLEVEL_OUTOF10: 3
PAINLEVEL_OUTOF10: 5

## 2024-12-29 ASSESSMENT — PAIN DESCRIPTION - ORIENTATION
ORIENTATION: LOWER
ORIENTATION: LOWER

## 2024-12-29 ASSESSMENT — PAIN DESCRIPTION - DESCRIPTORS
DESCRIPTORS: CRAMPING;ACHING
DESCRIPTORS: ACHING

## 2024-12-29 ASSESSMENT — PAIN DESCRIPTION - LOCATION
LOCATION: ABDOMEN

## 2024-12-29 NOTE — LACTATION NOTE
This note was copied from a baby's chart.  Mother states that baby is mostly taking bottles but also nursing. Mother states that baby nurses well when she is latched. Mother has a hospital garde pump. Educated mother about maintaining milk supply. Encouraged mother to do a 15 minutes pump anytime baby receives a supplement of formula.

## 2024-12-30 VITALS
RESPIRATION RATE: 18 BRPM | DIASTOLIC BLOOD PRESSURE: 68 MMHG | OXYGEN SATURATION: 98 % | HEART RATE: 78 BPM | TEMPERATURE: 99.3 F | WEIGHT: 255 LBS | BODY MASS INDEX: 37.77 KG/M2 | HEIGHT: 69 IN | SYSTOLIC BLOOD PRESSURE: 129 MMHG

## 2024-12-30 PROCEDURE — 6370000000 HC RX 637 (ALT 250 FOR IP): Performed by: OBSTETRICS & GYNECOLOGY

## 2024-12-30 RX ORDER — IBUPROFEN 800 MG/1
800 TABLET, FILM COATED ORAL EVERY 8 HOURS
Qty: 30 TABLET | Refills: 3 | Status: SHIPPED | OUTPATIENT
Start: 2024-12-30

## 2024-12-30 RX ORDER — PSEUDOEPHEDRINE HCL 30 MG
100 TABLET ORAL 2 TIMES DAILY
Qty: 90 CAPSULE | Refills: 3 | Status: SHIPPED | OUTPATIENT
Start: 2024-12-30

## 2024-12-30 RX ORDER — OXYCODONE HYDROCHLORIDE 5 MG/1
5 TABLET ORAL EVERY 4 HOURS PRN
Qty: 15 TABLET | Refills: 0 | Status: SHIPPED | OUTPATIENT
Start: 2024-12-30 | End: 2025-01-06

## 2024-12-30 RX ADMIN — ACETAMINOPHEN 1000 MG: 500 TABLET ORAL at 08:23

## 2024-12-30 RX ADMIN — ACETAMINOPHEN 1000 MG: 500 TABLET ORAL at 00:07

## 2024-12-30 RX ADMIN — FAMOTIDINE 20 MG: 20 TABLET, FILM COATED ORAL at 08:24

## 2024-12-30 RX ADMIN — ESCITALOPRAM OXALATE 10 MG: 10 TABLET ORAL at 08:23

## 2024-12-30 RX ADMIN — IBUPROFEN 800 MG: 400 TABLET, FILM COATED ORAL at 04:55

## 2024-12-30 RX ADMIN — DOCUSATE SODIUM 100 MG: 100 CAPSULE, LIQUID FILLED ORAL at 08:23

## 2024-12-30 ASSESSMENT — PAIN SCALES - GENERAL
PAINLEVEL_OUTOF10: 2
PAINLEVEL_OUTOF10: 4

## 2024-12-30 ASSESSMENT — PAIN DESCRIPTION - LOCATION: LOCATION: ABDOMEN

## 2024-12-30 NOTE — DISCHARGE SUMMARY
Obstetrical Discharge Form    Primary OB Clinician: Kim Dailey MD; Natalie Zavala MD     EDC: Estimated Date of Delivery: 1/15/25    Gestational Age: 37w2d    Antepartum complications: A1GDM, BMI 36, GBS+, Rh negative, AMA, anxiety, pre-eclampsia with severe features    Date of Delivery: 24 ; Time of Delivery: 1041     Delivered By: Natalie Zavala MD    Delivery Type: primary  section, low transverse incision    Jennifer Nassar is a 39 y.o.  who was admitted at 36w5d for IOL in the setting of newly diagnosed pre-eclampsia with severe features. She underwent a prolonged IOL, until primary  section was requested at 37w2d. She delivered without any complications. She was on magnesium throughout induction and for 24 hours postpartum. She did not require initiation of any PO antihypertensives. She recovered well post-operatively and met all goals for discharge by POD 3.    Tubal Ligation: n/a    Baby: Liveborn female, Apgars 5/7/8, weight 2942g    Anesthesia: Epidural    Intrapartum complications: Failed IOL    Laceration: n/a    Episiotomy: none,    Placenta: spontaneous    Feeding method: both breast and bottle    Rh Immune globulin given: No, baby also Rh negative    Rubella vaccine given: not applicable    Discharge Date: 24    Early Discharge:  NO    Plan:     Address and phone number verified and same.  Follow-up appointment with Dr. Dailey in 1 week.      Kim Dailey MD  2024  8:28 AM

## 2024-12-30 NOTE — PROGRESS NOTES
0730-Bedside and Verbal shift change report given to CHAY Dover RN  (oncoming nurse) by SANDRA Floyd RN  (offgoing nurse). Report included the following information Nurse Handoff Report.  Pt resting in bed.  No complaints voiced.   0748-Dr Zavala at bedside, viewed strip.  Plan to start pitocin and plan to AROM around lunch time. Pt requesting epidural prior to AROM.   1103-Pt requesting epidural in anticipation of AROM by Dr Zavala. IVF bolus started.   1129-Dr Mejia at bedside for epidural placement.   1212-Dr Zavala at bedside, viewed strip.  SVE done. AROM, scant amount of clear blood tinged fluid.  Will continue to monitor for leaking.  Order received to start magnesium sulfate now.   1236-Magnesium sulfate 4gms bolus started.   1630-Dr Zavala at bedside, viewed strip.  SVE 4/80. IUPC placed.   1930-Bedside and Verbal shift change report given to LACI Godinez RN (oncoming nurse) by LEX dover RN  (offgoing nurse). Report included the following information Nurse Handoff Report.    
0730: Bedside and Verbal shift change report given to MARK Crawley RN (oncoming nurse) by KAROL Morejon RN (offgoing nurse). Report included the following information Nurse Handoff Report, Adult Overview, Intake/Output, MAR, Recent Results, and Event Log.     0820: Pt sitting up eating breakfast.     0848: Pt on birthing ball, family member applying counter pressure.     0921: POC glucose 98 (see results).     0931: Penicillin, 4th dose given (see MAR).     1021: Pitocin stopped for pit break.    1057: TUMS 2,500mg given (see MAR).     1123: Lexapro given (see MAR).    1140: Dr. Rainey at the bedside to assess pt progress. SVE 1/ / . Plans for cytotec.     1145: Bedside and Verbal shift change report given to ZEE Leal RN (oncoming nurse) by MARK Crawley RN (offgoing nurse). Report included the following information Nurse Handoff Report, Adult Overview, MAR, Recent Results, and Event Log.        
0735- Bedside and Verbal shift change report given to DARYN Richardson RN (oncoming nurse) by LACI Godinez RN (offgoing nurse). Report included the following information Nurse Handoff Report, Intake/Output, MAR, and Recent Results.     0740- Dr Zavala at bedside, c-section called due to maternal request     1011- Patient off the monitor for transfer to OR2 for     1340- TRANSFER - OUT REPORT:    Verbal report given to Hien MULLIGAN on Jennifer Nassar  being transferred to [facility] for routine progression of patient care       Report consisted of patient's Situation, Background, Assessment and   Recommendations(SBAR).     Information from the following report(s) Nurse Handoff Report, Intake/Output, MAR, and Recent Results was reviewed with the receiving nurse.           Lines:   Peripheral IV 24 Left;Anterior Hand (Active)   Site Assessment Clean, dry & intact 24   Line Status Infusing 24   Line Care Connections checked and tightened 24   Phlebitis Assessment No symptoms 24   Infiltration Assessment 0 24   Dressing Status Clean, dry & intact 24   Dressing Type Transparent 24        Opportunity for questions and clarification was provided.      Patient transported with:  Registered Nurse        
1405: patient arrived ambulatory to LD3 for IOL.    1506: RN at bedside adjusting monitors.     1558: Dr Rainey at bedside discussing POC, including potential need for IV Magnesium.  Will hold off on Magnesium at this time and closely monitor BP's.    1606: V scan confirms vertex position.     1614: SVE closed by Dr Rainey.  Plan to start Cytotec.     1624: Novii monitors placed.     1850: patient finished dinner; states she normally does fasting and 2 hour post prandial BG checks.   
1930: Bedside and Verbal shift change report given to TRIXIE Floyd (oncoming nurse) by DARYN Leal (offgoing nurse). Report included the following information Nurse Handoff Report, Intake/Output, MAR, and Recent Results.      2355: Dr Rainey made aware that cytotec order expires at midnight. Per Dr Rainey, ok to order additional dose of 25mcg buccal.       0730: Bedside and Verbal shift change report given to Crystal Dover RN (oncoming nurse) by TRIXIE Floyd RN (offgoing nurse). Report included the following information Nurse Handoff Report, MAR, and Recent Results.    
1935 BSR received from MIRTA Nelson RN. Pt is accompanied by her mom and her best friend. Plan of care reviewed with pt. Pt is on the Marielena/Novii.     2036 Second dose of miso 25mcg    0043 Third dose of Miso 25mcg     0443 Fourth dose of miso 25mcg. Pt states that she is starting to feel pain apart from cramping. [place] picking up irritability and sporadic contractions.     0720 Dr Zavala at bedside unable to perform CE. Plan to do dose of miso at 0845 and Dr. Zavala will then place rowan balloon.     0730  BSR given to ZEE Leal RN.  Report included the following information Nurse Handoff Report, MAR, and Recent Results.    
1935 BSR received from ZEE Leal RN, IVF verified. Plan of care reviewed with patient. VSSA.     2017 CE preformed by Dr. Travis. Pt  30cm/50/-3. Will continue to titrate pitocin and encourage position changes as baby and mom tolerate.     0530 Pt asking for pain control options. Stadol administered at 0535.     0740 Report given to MARK Crawley RN   Report included the following information Nurse Handoff Report, MAR, and Recent Results.    
1935 bedside report received from CHAY Dover RN.   1945 patient repositioned to left side, peanut ball removed at this time for patient comfort  2033 side lying release x both sides   2042 right side runner's lunge with peanut ball between knees , tburg  2147 pt reports rectal pressure with contractions, taken out of tburg, SVE 5/80 KAROL Godinez RN  2150 throne position to assist with epidural coverage  2152 patient taken out of throne position and repositioned to left side for FHR prolonged decel, pt reports dizziness and headache, fluid bolus initiated  2153 pitocin stopped , repositioned to right side  2155 deceleration resolved, Dr. Travis at bedside to evaluate, plan for 2 hr pitocin break  2210 patient given 240 ccs apple juice  2223 blood glucose stable at 81, pt reports lessening headache, nausea resolved, desires some rest at this time. Aware of plan to restart pitocin around midnight   2300 reposition to left side, patient declines peanut ball at this time in efforts to rest   2357 pitocin restarted see MAR   0005 right side exaggerated runner's with peanut ball between knees   0105 left side exaggerated runner's with peanut ball between knees   0210 right side flying cowgirl with peanut ball between knees   0258 pt reports increased rectal pressure, SVE 6-7/90/-2 KAROL Godinez   0303 anesthesia at bedside for redose   0311 left side flying cowgirl with peanut ball between knees   0351 tburg  0428 taken out of tburg, pt expresses urge to push, SVE no change MIRTA Patel  0435 side lying release x both sides   0455 anesthesia at bedside for another redose  0458 right side, patient declines peanut ball at this time for comfort   0600 patient reporting severe rectal discomfort, burning with contractions.   0642 left side exaggerated runners with peanut ball   0642 pitocin stopped per patient request related to rectal pain  0648 hands and knees in an attempt to help with discomfort   0710 repositioned back onto left 
1937: Bedside and Verbal shift change report given to TERESA Cyr RN (oncoming nurse) by CHAY Amaya RN (offgoing nurse). Report included the following information Nurse Handoff Report, Index, Adult Overview, Surgery Report, Intake/Output, MAR, Recent Results, and Med Rec Status.    
23:30 : Bedside shift change report given to avelino Bhatia RN (oncoming nurse) by Zahida Cyr RN (offgoing nurse). Report included the following information Nurse Handoff Report, Surgery Report, Intake/Output, MAR, and Recent Results.      21:25 Dr. Aldana made aware of Pt's low blood pressures. Dr. Aldana ordered to stop magnesium intravenous.  
750- Bedside and Verbal shift change report given to Jamia BURTON RN (oncoming nurse) by Vi WEINBERG RN (offgoing nurse). Report included the following information Nurse Handoff Report, Index, Adult Overview, Surgery Report, Intake/Output, MAR, Recent Results, and Med Rec Status.      1130- I have reviewed discharge instruction and reviewed medication times. Patient given copy of discharge instructions. Patient verbalizes understanding and denies any further questions at this time.  Pt walked up with to Pediatric unit due to baby being admitted on that floor.  
Bedside and Verbal shift change report given to CHAY Amaya RN (oncoming nurse) by CHAY Bhatia RN (offgoing nurse). Report included the following information Nurse Handoff Report and Index.     
Bedside and Verbal shift change report given to DARYN Leal RN (oncoming nurse) by A Darleen RN (offgoing nurse). Report included the following information Nurse Handoff Report, MAR, and Recent Results.   1055:  Dr Zavala at bedside.  SVE 1.5/0/-3  Foleyballoon placed 60/30  1611:  30 cc inserted into vaginal balloon  1615:  Pt in knee chest  1655:  Pt up to bathroom.  RN applied gentle traction to balloon.  Balloon removed.  Pt in shower.  1738:  RN updated Dr Travis on progression of labor, and change of FHR.  Plan to start pitocin when appropriate.  1753:  Pt ambulating in the hallway  1930:  Bedside and Verbal shift change report given to LACI Morejon RN (oncoming nurse) by DARYN Leal RN (offgoing nurse). Report included the following information Nurse Handoff Report, MAR, and Recent Results.       
Bedside and Verbal shift change report given to DARYN Leal RN (oncoming nurse) by HALIE Crawley RN (offgoing nurse). Report included the following information Nurse Handoff Report, MAR, and Recent Results.   1215:  RN at bedside talking to pt about her induction experience.  Plan for pt to shower and to take a walk off of the unit (OK with Dr Rainey)    1352:  Bps reported to Dr Rainey.  Order to repeat BP in 30min x2  1750:  Pt on birthing ball   
Bedside and Verbal shift change report given to John MULLIGAN (oncoming nurse) by CHAY Amaya RN (offgoing nurse). Report included the following information Nurse Handoff Report, Index, Adult Overview, Surgery Report, Intake/Output, MAR, Recent Results, and Med Rec Status.    
Bedside shift change report given to Isaura Bhatia RN (oncoming nurse) by Hien Amaya RN (offgoing nurse). Report included the following information Nurse Handoff Report, Intake/Output, MAR, and Recent Results.    
Labor Progress Note  40yo G1  induction at 36w6d for pre eclampsia with SF (ALT lab   GHTN dx 34 weeks, progressed to Pre E with P/C 0.5     Pt well known to me  S/P 6 doses of Cytotec, S/P Cook , On Pit all night maxed out at 20 milliunits this morning for 4 hrs.  Does not feel any contractions, very comfortable    Patient seen, fetal heart rate and contraction pattern evaluated.   VSS AF  Physical Exam:  Cervical Exam: 1/soft 50/posterior/ballotable: Pt does not desire  second cook catheter  Membranes:  Intact  Uterine Activity: Frequency: Irregular  Fetal Heart Rate: Reactive  Accelerations: yes  Decelerations: none  Uterine contractions: irregular    Assessment/Plan:  Reassuring fetal status.   Bps stable  Will start with 25 mcg cytotec  buccal    Sixto Rainey MD          
Labor Progress Note  Patient seen, fetal heart rate and contraction pattern evaluated.     Physical Exam:  Cervical Exam: 3 cm/50/-3  Membranes:  Intact  Uterine Activity: Frequency: Irregular  Fetal Heart Rate: Reactive  Accelerations: yes  Decelerations: none  Variability- moderate  Uterine contractions: irregular    Assessment/Plan:  Reassuring fetal status. Will continue to observe overnight for labor.     Continue pitocin    Dedrick Travis MD          
Labor Progress Note  Patient seen, fetal heart rate and contraction pattern evaluated.     Physical Exam:  Cervical Exam: not examined  Membranes:  Intact  Uterine Activity: Frequency: regular  Fetal Heart Rate: 130  Accelerations: yes  Decelerations: prolonged resolved with position change  Uterine contractions: regular    Assessment/Plan:  Reassuring fetal status. Will continue to observe overnight for labor    Dedrick Travis MD          
OB Hospitalist    38yo G1 admitted for induction at 36w5d for pre eclampsia with SF (ALT lab)   Here for IOL  Introduced myself to Ms Nassar and her support team - mom and best friend  Bedside scan Vertex  Cervix closed /posterior  Bps 138/77,139/91,146/89 mm Hg  EFM Baseline 140, +accels, no decels, moderate variability  [place] No contractions  Pt asymptomatic. Bps in the mild range  Plan to hold off on Magnesium  Discussed we will start on Magnesium if Bps persistently elevated or pt gets asymptomatic.  Pt agreeable with plan of care  Will start with Cytotec 50 mcg buccal and then 25 mcg every 4 hrs.  Plan to evaluate for placement of cook after a couple of doses of Cytotec      Sixto Rainey MD  
Post-Operative  Day 1    Jennifer Nassar         Information for the patient's :  Cesario, GIRL Jennifer [[id number]]   , Low Transverse Patient doing well without unusual complaints. Tolerating general diet. No flatus yet. Denies cp/sob/n/v. Had some dizziness associated with hypotension on magnesium last night but this resolved with improved blood pressure when magnesium was discontinued. She reports normal lochia. Steven came out this am -has not voided yet.     Vitals:  /66   Pulse 68   Temp 98.8 °F (37.1 °C) (Oral)   Resp 16   Ht 1.753 m (5' 9\")   Wt 115.7 kg (255 lb)   LMP 04/10/2024   SpO2 96%   Breastfeeding Unknown   BMI 37.66 kg/m²   Temp (24hrs), Av.2 °F (36.8 °C), Min:97.5 °F (36.4 °C), Max:98.8 °F (37.1 °C)      Last 24hr Input/Output:    Intake/Output Summary (Last 24 hours) at 2024 0841  Last data filed at 2024 0444  Gross per 24 hour   Intake 3720.68 ml   Output 2560 ml   Net 1160.68 ml          Exam:       Patient without distress.   Heart regular rate and rhythm   Lungs CTA B/l  Abdomen:soft, expected tenderness, fundus firm, wound dressing intact     Lower extremities are nontender without significant edema. No cords    Labs:   Lab Results   Component Value Date/Time    WBC 14.0 2024 04:34 AM    WBC 15.6 2024 02:40 PM    WBC 14.4 2024 11:13 AM    WBC 14.7 2024 04:36 PM    WBC 21.5 2021 04:16 PM    WBC 17.6 10/14/2019 09:50 PM    HGB 11.2 2024 04:34 AM    HGB 13.1 2024 02:40 PM    HGB 12.8 2024 11:13 AM    HGB 13.2 2024 04:36 PM    HGB 15.3 2021 04:16 PM    HGB 14.9 10/14/2019 09:50 PM    HCT 32.8 2024 04:34 AM    HCT 38.0 2024 02:40 PM    HCT 37.1 2024 11:13 AM    HCT 39.1 2024 04:36 PM    HCT 46.1 2021 04:16 PM    HCT 44.1 10/14/2019 09:50 PM     2024 04:34 AM     2024 02:40 PM     2024 11:13 AM     2024 04:36 PM 
Post-Operative  Day 2    Jennifer Nassar     Information for the patient's :  Cesario, GIRL Jennifer [[id number]]   , Low Transverse Patient doing well without unusual complaints. Passing flatus, voiding and ambulating without difficulty. Tolerating regular diet. Denies cp/sob/n/v.    Vitals:  /74   Pulse 67   Temp 98.8 °F (37.1 °C) (Oral)   Resp 16   Ht 1.753 m (5' 9\")   Wt 115.7 kg (255 lb)   LMP 04/10/2024   SpO2 98%   Breastfeeding Unknown   BMI 37.66 kg/m²   Temp (24hrs), Av °F (37.2 °C), Min:98.8 °F (37.1 °C), Max:99.3 °F (37.4 °C)        Exam:      Patient without distress.   Heart regular rate and rhythm   Lungs CTA b/l               Abdomen: soft, expected tenderness, fundus firm     Bandage clean, dry and intact               Lower extremities are nontender with trace edema bilaterally.  No cords    Labs:   Lab Results   Component Value Date/Time    WBC 14.0 2024 04:34 AM    WBC 15.6 2024 02:40 PM    WBC 14.4 2024 11:13 AM    WBC 14.7 2024 04:36 PM    WBC 21.5 2021 04:16 PM    WBC 17.6 10/14/2019 09:50 PM    HGB 11.2 2024 04:34 AM    HGB 13.1 2024 02:40 PM    HGB 12.8 2024 11:13 AM    HGB 13.2 2024 04:36 PM    HGB 15.3 2021 04:16 PM    HGB 14.9 10/14/2019 09:50 PM    HCT 32.8 2024 04:34 AM    HCT 38.0 2024 02:40 PM    HCT 37.1 2024 11:13 AM    HCT 39.1 2024 04:36 PM    HCT 46.1 2021 04:16 PM    HCT 44.1 10/14/2019 09:50 PM     2024 04:34 AM     2024 02:40 PM     2024 11:13 AM     2024 04:36 PM     2021 04:16 PM     10/14/2019 09:50 PM       No results found for this or any previous visit (from the past 24 hour(s)).      Assessment: Post-Op day 2 s/p Primary LTCS for maternal request after iol for pre-eclampsia with severe features and GDMA1  Normal blood pressure currently s/p magnesium and on no antihypertensive 
S: Patient exhausted.  She requests a  section.  Has been refusing pitocin due to the intense pelvic pressure causing discomfort.     O: Patient Vitals for the past 24 hrs:   BP Temp Temp src Pulse Resp SpO2   24 0755 -- 97.7 °F (36.5 °C) Oral -- -- --   24 0710 (!) 158/74 -- -- (!) 107 19 98 %   24 0551 135/84 98.2 °F (36.8 °C) Axillary 100 -- 98 %   24 0500 (!) 151/79 -- -- 85 -- 99 %   24 0419 130/84 -- -- 75 -- 99 %   24 0400 (!) 141/82 -- -- 68 17 97 %   24 0315 124/60 -- -- 61 -- --   24 0303 (!) 102/55 97.8 °F (36.6 °C) Oral 72 -- 96 %   24 0202 135/67 97.8 °F (36.6 °C) Oral 63 18 96 %   24 0101 131/63 97.6 °F (36.4 °C) Oral 72 17 96 %   24 0000 (!) 110/56 98.5 °F (36.9 °C) Oral 64 17 96 %   24 2303 (!) 118/59 -- -- 59 18 97 %   24 2202 134/71 98.5 °F (36.9 °C) -- 60 -- 96 %   24 2156 122/66 -- -- 63 -- 96 %   24 2100 121/72 -- -- 65 17 97 %   24 113/60 98.1 °F (36.7 °C) Oral 63 -- 98 %   24 1904 117/62 -- -- 64 16 96 %   24 1807 (!) 124/58 98.4 °F (36.9 °C) Oral 66 16 96 %   24 1658 (!) 119/57 -- -- 65 16 96 %   24 1559 120/65 98.4 °F (36.9 °C) Oral 62 16 96 %   24 1457 (!) 113/57 -- -- 68 16 97 %   24 1404 110/61 97.8 °F (36.6 °C) Oral 63 16 --   24 1334 119/80 -- -- 66 16 --   24 1304 (!) 100/59 -- -- 65 16 --   24 1248 (!) 96/53 -- -- 64 18 --   24 1234 (!) 114/59 98.1 °F (36.7 °C) Oral 71 18 96 %   24 1157 125/60 -- -- 80 16 --   24 1152 125/63 -- -- 72 16 --   24 1147 137/74 -- -- 75 16 --   24 1143 (!) 146/88 -- -- 76 16 --   24 1141 -- -- -- -- -- 97 %   24 1104 (!) 142/80 -- -- 66 16 --   24 1001 123/69 98.1 °F (36.7 °C) Oral 77 14 --   24 0904 133/88 -- -- 75 14 --       Gen: alert and oriented X3   Resp:nonlabored respirations  Cardiac: normal peripheral perfusion  Abdomen: soft, 
S: Pt comfortable with no complaints.     Patient Vitals for the past 24 hrs:   BP Temp Temp src Pulse Resp SpO2 Height Weight   24 0441 127/74 97.6 °F (36.4 °C) Oral 71 18 96 % -- --   24 0043 132/76 -- -- 75 20 97 % -- --   24 1945 (!) 146/88 97.9 °F (36.6 °C) Oral 93 18 95 % -- --   24 1849 (!) 143/92 -- -- 100 -- -- -- --   24 1438 (!) 146/89 -- -- (!) 102 -- -- -- --   24 1419 (!) 139/91 98.2 °F (36.8 °C) Oral 98 16 98 % 1.753 m (5' 9\") 115.7 kg (255 lb)     Gen: alert and oriented X3   Resp:nonlabored respirations  Cardiac: normal peripheral perfusion  Abdomen: soft, nontender, nondistended. Gravid  Ext: no pitting edema  SVE: posterior cervix unable to reach, suboptimal positioning due to bed not being labor bed    Cat 1 fetal tracing        A/P 38yo G1  induction at 36w6d for pre eclampsia with SF (ALT lab)     Induction: unfavorable  - s/p cytotec x4, repeat once more then reattempt rowan bulb placement once in labor bed  - Plan pit/AROM  - GBS +, start pitocin once rowan out  - epidural PRN     Pre eclampsia with SF: GHTN dx 34 weeks, progressed to Pre E with P/C 0.5   - Now with ALT 69 (upper limit nlm 32)  - asymptomatic, reassuring fetal surveillance  - Serial Bps, repeat PIH labs  - mag for seizure ppx once rowan out  - Proceed with induction     Pregnancy problems:   - GDMA1- no meds, growth 59%ile  - Pre Eclampsia with SF, dx today at 36w5d with elevated ALT  - BMI 36  - GBS +  - Rh neg s/p rhogam at 28 weeks  - AMA age 39- NIPT normal  - Anxiety on lexapro  - diverticulosis- no meds stable     Dispo: expectant , CD for standard maternal and fetal indications    Natalie Zavala MD    
S: Pt siting comfortably in chair at the side.     O:   Patient Vitals for the past 24 hrs:   BP Temp Temp src Pulse Resp SpO2 Height Weight   24 0910 (!) 140/91 98.1 °F (36.7 °C) Oral (!) 105 18 98 % -- --   24 0441 127/74 97.6 °F (36.4 °C) Oral 71 18 96 % -- --   24 0043 132/76 -- -- 75 20 97 % -- --   24 1945 (!) 146/88 97.9 °F (36.6 °C) Oral 93 18 95 % -- --   24 1849 (!) 143/92 -- -- 100 -- -- -- --   24 1438 (!) 146/89 -- -- (!) 102 -- -- -- --   24 1419 (!) 139/91 98.2 °F (36.8 °C) Oral 98 16 98 % 1.753 m (5' 9\") 115.7 kg (255 lb)     Gen: alert and oriented X3   Resp:nonlabored respirations  Cardiac: normal peripheral perfusion  Abdomen: soft, nontender, nondistended. Gravid  Ext: no pitting edema  SVE 1.5cm/0/-3, cephalic sutures palpated    Cat 1 fetal tracing  Irregular contractions        A/P 40yo G1  induction at 36w6d for pre eclampsia with SF (ALT lab)     Induction: unfavorable  - s/p cytotec x4, repeat once more   - rowan placed 1100 60/30cc  - Plan pit/AROM PRN  - GBS +, start pitocin once rowan out  - epidural PRN     Pre eclampsia with SF: GHTN dx 34 weeks, progressed to Pre E with P/C 0.5   - Now with ALT 69 (upper limit nlm 32)  - asymptomatic, reassuring fetal surveillance  - Serial Bps, repeat PIH labs  - mag for seizure ppx once rowan out  - Proceed with induction     Pregnancy problems:   - GDMA1- no meds, growth 59%ile  - Pre Eclampsia with SF, dx today at 36w5d with elevated ALT  - BMI 36  - GBS +  - Rh neg s/p rhogam at 28 weeks  - AMA age 39- NIPT normal  - Anxiety on lexapro  - diverticulosis- no meds stable     Dispo: expectant , CD for standard maternal and fetal indications     Natalie Zavala MD  
S: comfortable. No complaints    O: Patient Vitals for the past 24 hrs:   BP Temp Temp src Pulse Resp SpO2   12/26/24 1559 120/65 98.4 °F (36.9 °C) Oral 62 16 96 %   12/26/24 1457 (!) 113/57 -- -- 68 16 97 %   12/26/24 1404 110/61 97.8 °F (36.6 °C) Oral 63 16 --   12/26/24 1334 119/80 -- -- 66 16 --   12/26/24 1304 (!) 100/59 -- -- 65 16 --   12/26/24 1248 (!) 96/53 -- -- 64 18 --   12/26/24 1234 (!) 114/59 98.1 °F (36.7 °C) Oral 71 18 96 %   12/26/24 1157 125/60 -- -- 80 16 --   12/26/24 1152 125/63 -- -- 72 16 --   12/26/24 1147 137/74 -- -- 75 16 --   12/26/24 1143 (!) 146/88 -- -- 76 16 --   12/26/24 1141 -- -- -- -- -- 97 %   12/26/24 1104 (!) 142/80 -- -- 66 16 --   12/26/24 1001 123/69 98.1 °F (36.7 °C) Oral 77 14 --   12/26/24 0904 133/88 -- -- 75 14 --   12/26/24 0736 118/69 98 °F (36.7 °C) Oral 67 14 --   12/26/24 0357 111/60 98.1 °F (36.7 °C) Oral 63 -- --   12/25/24 2344 112/64 98 °F (36.7 °C) Oral 84 -- --   12/25/24 1943 121/72 98.3 °F (36.8 °C) Oral 65 14 95 %       Gen: alert and oriented X3   Resp:nonlabored respirations  Cardiac: normal peripheral perfusion  Abdomen: soft, nontender, nondistended. Gravid  Ext: no pitting edema  SVE 4/80/-3, IUPC placed  Pit 20    CEFM: cat 1 fetal tracing  [place]: contractions every 2-3 minutes      A/P 38yo G1  induction at 37w1d for pre eclampsia with SF (ALT lab)     Induction: prolonged induction Day 4   - S/p cytotec course, cook catheter, cytotec course again  - Cervix this morning 0745 3/40/-3-->3/50/-3 AROM 1200-->IUPC 4/80/-3 (1630)  - reviewed option of continued induction vs CD  - wpidural in place  - GBS + on PCN  - epidural PRN     Pre eclampsia with SF: GHTN dx 34 weeks, progressed to Pre E with P/C 0.5   - Now with ALT 69 (upper limit nlm 32)  - asymptomatic, reassuring fetal surveillance  - Serial Bps, repeat PIH labs  - mag for seizure ppx      Pregnancy problems:   - GDMA1- no meds, growth 59%ile  - BMI 36  - Rh neg s/p rhogam at 28 weeks  - AMA age 
S: much more comfortable with epidural in place    Patient Vitals for the past 24 hrs:   BP Temp Temp src Pulse Resp SpO2   12/26/24 1157 125/60 -- -- 80 16 --   12/26/24 1152 125/63 -- -- 72 16 --   12/26/24 1147 137/74 -- -- 75 16 --   12/26/24 1143 (!) 146/88 -- -- 76 16 --   12/26/24 1141 -- -- -- -- -- 97 %   12/26/24 1104 (!) 142/80 -- -- 66 16 --   12/26/24 1001 123/69 98.1 °F (36.7 °C) Oral 77 14 --   12/26/24 0904 133/88 -- -- 75 14 --   12/26/24 0736 118/69 98 °F (36.7 °C) Oral 67 14 --   12/26/24 0357 111/60 98.1 °F (36.7 °C) Oral 63 -- --   12/25/24 2344 112/64 98 °F (36.7 °C) Oral 84 -- --   12/25/24 1943 121/72 98.3 °F (36.8 °C) Oral 65 14 95 %   12/25/24 1559 128/81 -- -- 65 -- --   12/25/24 1450 (!) 153/89 -- -- 80 -- --   12/25/24 1427 120/61 -- -- 73 -- --   12/25/24 1347 (!) 151/91 -- -- 76 -- --   12/25/24 1331 (!) 156/94 -- -- 80 -- --   12/25/24 1330 (!) 166/94 -- -- 88 -- --   12/25/24 1321 (!) 161/90 98 °F (36.7 °C) Oral 82 18 98 %     Gen: alert and oriented X3   Resp:nonlabored respirations  Cardiac: normal peripheral perfusion  Abdomen: soft, nontender, nondistended. Gravid  Ext: no pitting edema  SVE 3.5/50/-3, well applied fetal head, AROM with return of clear fluid  Pit- 12    Cat 1 fetal tracing  Contractions every 2 minutes           A/P 40yo G1  induction at 37w1d for pre eclampsia with SF (ALT lab)     Induction: prolonged induction Day 4   - S/p cytotec course, cook catheter, cytotec course again  - Cervix this morning 0745 3/40/-3-->3/50/-3 AROM 1200  - reviewed option of continued induction vs CD  - wpidural in place  - GBS + on PCN  - epidural PRN     Pre eclampsia with SF: GHTN dx 34 weeks, progressed to Pre E with P/C 0.5   - Now with ALT 69 (upper limit nlm 32)  - asymptomatic, reassuring fetal surveillance  - Serial Bps, repeat PIH labs  - mag for seizure ppx      Pregnancy problems:   - GDMA1- no meds, growth 59%ile  - BMI 36  - Rh neg s/p rhogam at 28 weeks  - AMA age 39- 
TRANSFER - IN REPORT:    Verbal report received from CHAY Amaya RN on Jennifer Nassar  being received from LD for routine progression of patient care      Report consisted of patient's Situation, Background, Assessment and   Recommendations(SBAR).     Information from the following report(s) Nurse Handoff Report and Index was reviewed with the receiving nurse.    Opportunity for questions and clarification was provided.      Assessment completed upon patient's arrival to unit and care assumed.     
(ALT lab)     Induction: prolonged induction Day 3    - S/p cytotec course, cook catheter, cytotec course again  - Cervix this morning 0745 3/40/-3  - reviewed option of continued induction vs CD  - Pt desires to continue with induction, plan pit/, epidural this AM and AROM late morning.   - GBS +, start PCN  - epidural PRN     Pre eclampsia with SF: GHTN dx 34 weeks, progressed to Pre E with P/C 0.5   - Now with ALT 69 (upper limit nlm 32)  - asymptomatic, reassuring fetal surveillance  - Serial Bps, repeat PIH labs  - mag for seizure ppx      Pregnancy problems:   - GDMA1- no meds, growth 59%ile  - Pre Eclampsia with SF, dx today at 36w5d with elevated ALT  - BMI 36  - GBS +  - Rh neg s/p rhogam at 28 weeks  - AMA age 39- NIPT normal  - Anxiety on lexapro  - diverticulosis- no meds stable     Dispo: expectant , CD for standard maternal and fetal indications     Natalie Zavala MD       
day 3, doing well  Pre-eclampsia with severe features: s/p Mag x 24 hours PP, not on any antihypertensives, blood pressures normal to mild range  A1GDM  A negative: Baby also Rh negative, no Rhogam needed  Baby girl    Plan:   1. Discharge today: to room in  2. Instructions given- pelvic rest, restrictions on driving and lifting, wound care instructions, follow-up  3. Discharge Medications: see discharge instruction sheet   4. Follow up with me in 1 week for BP, incision check      Kim Dailey MD  12/30/2024  8:26 AM

## 2024-12-31 ENCOUNTER — OFFICE VISIT (OUTPATIENT)
Age: 39
End: 2024-12-31

## 2024-12-31 VITALS
WEIGHT: 253 LBS | DIASTOLIC BLOOD PRESSURE: 90 MMHG | OXYGEN SATURATION: 97 % | BODY MASS INDEX: 39.71 KG/M2 | HEART RATE: 79 BPM | TEMPERATURE: 98.6 F | HEIGHT: 67 IN | SYSTOLIC BLOOD PRESSURE: 132 MMHG | RESPIRATION RATE: 16 BRPM

## 2024-12-31 DIAGNOSIS — N30.01 ACUTE CYSTITIS WITH HEMATURIA: Primary | ICD-10-CM

## 2024-12-31 DIAGNOSIS — R50.9 FEVER, UNSPECIFIED FEVER CAUSE: ICD-10-CM

## 2024-12-31 LAB
BILIRUBIN, URINE, POC: NEGATIVE
BLOOD URINE, POC: ABNORMAL
EXP DATE SOLUTION: NORMAL
EXP DATE SWAB: NORMAL
EXPIRATION DATE: NORMAL
GLUCOSE URINE, POC: NEGATIVE
INFLUENZA A ANTIGEN, POC: NEGATIVE
INFLUENZA B ANTIGEN, POC: NEGATIVE
KETONES, URINE, POC: NEGATIVE
LEUKOCYTE ESTERASE, URINE, POC: ABNORMAL
LOT NUMBER POC: NORMAL
LOT NUMBER SOLUTION: NORMAL
LOT NUMBER SWAB: NORMAL
NITRITE, URINE, POC: NEGATIVE
PH, URINE, POC: 6 (ref 4.6–8)
PROTEIN,URINE, POC: NEGATIVE
SARS-COV-2 RNA, POC: NEGATIVE
SPECIFIC GRAVITY, URINE, POC: 1.02 (ref 1–1.03)
URINALYSIS CLARITY, POC: ABNORMAL
URINALYSIS COLOR, POC: YELLOW
UROBILINOGEN, POC: NORMAL MG/DL

## 2024-12-31 RX ORDER — CEPHALEXIN 500 MG/1
500 CAPSULE ORAL 3 TIMES DAILY
Qty: 21 CAPSULE | Refills: 0 | Status: SHIPPED | OUTPATIENT
Start: 2024-12-31 | End: 2025-01-07

## 2024-12-31 NOTE — PROGRESS NOTES
No tenderness.   Lymphadenopathy:      Cervical: No cervical adenopathy.   Skin:     General: Skin is warm and dry.      Findings: No erythema or rash.   Neurological:      General: No focal deficit present.      Mental Status: She is alert and oriented to person, place, and time.   Psychiatric:         Mood and Affect: Mood normal.         Behavior: Behavior normal.         Thought Content: Thought content normal.         Judgment: Judgment normal.          ASSESSMENT/PLAN:  1. Acute cystitis with hematuria  -     cephALEXin (KEFLEX) 500 MG capsule; Take 1 capsule by mouth 3 times daily for 7 days, Disp-21 capsule, R-0Normal  -     Urine culture (clean catch); Future  2. Fever, unspecified fever cause  -     POCT Influenza A/B Antigen  -     AMB POC COVID-19 COV  -     AMB POC URINALYSIS DIP STICK AUTO W/ MICRO      Urinary Tract Infection -  - Urinalysis positive for 2+ blood and 2+ leukocyte esterase  - Start Keflex, three times daily for 7 days  - Will send a culture, we will call with results (if negative will stop antibiotics)  - Drink plenty of fluids (especially water) to help dilute your urine and aid in flushing out bacteria  - Avoid drinks that may irritate your bladder (coffee, alcohol, citrus juice)  - Please contact your primary care provider/obgyn to schedule a follow-up appointment  - If you have worsening symptoms, such as fever, flank pain, or vomiting, proceed to the ER for further evaluation    Elevated blood pressure -  - Recommend continued close follow-up with your ob/gyn  - If experience any chest pain, shortness of breath, headaches, or vision changes go to the nearest emergency room    Patient comfortable with plan     Covid and influenza testing negative    Discussed case with Dr. Mancuso      An electronic signature was used to authenticate this note.    Kim Sexton PA-C

## 2024-12-31 NOTE — PATIENT INSTRUCTIONS
Urinary Tract Infection -  - Your urinalysis was positive for infection today and you have been prescribed an antibiotic  - Keflex, three times daily for 7 days  - Drink plenty of fluids (especially water) to help dilute your urine and aid in flushing out bacteria  - Avoid drinks that may irritate your bladder (coffee, alcohol, citrus juice)  - A urine culture has been sent to the lab, the results will help confirm you are taking the most appropriate antibiotic. You will be notified of the results when they are available (this can take 2-3 days)  - Please contact your primary care provider to schedule a follow-up appointment  - If you have worsening symptoms, such as fever, flank pain, or vomiting, proceed to the ER for further evaluation    Elevated blood pressure -  - Recommend continued close follow-up with your ob/gyn  - If experience any chest pain, shortness of breath, headaches, or vision changes go to the nearest emergency room

## 2025-01-01 ENCOUNTER — LACTATION ENCOUNTER (OUTPATIENT)
Facility: HOSPITAL | Age: 40
End: 2025-01-01

## 2025-01-01 NOTE — LACTATION NOTE
This note was copied from a baby's chart.  Asked to consult 5 day old baby on pediatric unit admitted for sepsis. Mom had been attempting to breast feed but also supplementing with formula. She has now begun pumping and supplementing with her own breast milk. Mom said she would like to begin putting baby to the breast more consistently. She will call for assistance as needed with latching.

## 2025-01-29 ENCOUNTER — HOSPITAL ENCOUNTER (EMERGENCY)
Facility: HOSPITAL | Age: 40
Discharge: STILL ADMITTED OR ADMINISTRATIVE | End: 2025-01-29
Attending: STUDENT IN AN ORGANIZED HEALTH CARE EDUCATION/TRAINING PROGRAM
Payer: COMMERCIAL

## 2025-01-29 ENCOUNTER — HOSPITAL ENCOUNTER (EMERGENCY)
Facility: HOSPITAL | Age: 40
Discharge: HOME | End: 2025-01-29
Payer: COMMERCIAL

## 2025-01-29 VITALS
RESPIRATION RATE: 18 BRPM | SYSTOLIC BLOOD PRESSURE: 116 MMHG | OXYGEN SATURATION: 98 % | HEART RATE: 62 BPM | TEMPERATURE: 98.3 F | DIASTOLIC BLOOD PRESSURE: 68 MMHG

## 2025-01-29 VITALS
HEIGHT: 67 IN | SYSTOLIC BLOOD PRESSURE: 154 MMHG | RESPIRATION RATE: 20 BRPM | DIASTOLIC BLOOD PRESSURE: 104 MMHG | BODY MASS INDEX: 37.72 KG/M2 | HEART RATE: 73 BPM | TEMPERATURE: 97.5 F | OXYGEN SATURATION: 98 % | WEIGHT: 240.3 LBS

## 2025-01-29 DIAGNOSIS — I10 HYPERTENSION, UNSPECIFIED TYPE: Primary | ICD-10-CM

## 2025-01-29 LAB
ALBUMIN SERPL-MCNC: 3.6 G/DL (ref 3.5–5)
ALBUMIN/GLOB SERPL: 0.8 (ref 1.1–2.2)
ALP SERPL-CCNC: 388 U/L (ref 45–117)
ALT SERPL-CCNC: 505 U/L (ref 12–78)
ANION GAP SERPL CALC-SCNC: 7 MMOL/L (ref 2–12)
APPEARANCE UR: CLEAR
AST SERPL-CCNC: 329 U/L (ref 15–37)
BACTERIA URNS QL MICRO: NEGATIVE /HPF
BASOPHILS # BLD: 0.06 K/UL (ref 0–0.1)
BASOPHILS NFR BLD: 0.7 % (ref 0–1)
BILIRUB SERPL-MCNC: 5.1 MG/DL (ref 0.2–1)
BILIRUB UR QL CFM: POSITIVE
BUN SERPL-MCNC: 7 MG/DL (ref 6–20)
BUN/CREAT SERPL: 6 (ref 12–20)
CALCIUM SERPL-MCNC: 9.3 MG/DL (ref 8.5–10.1)
CHLORIDE SERPL-SCNC: 103 MMOL/L (ref 97–108)
CO2 SERPL-SCNC: 26 MMOL/L (ref 21–32)
COLOR UR: ABNORMAL
CREAT SERPL-MCNC: 1.08 MG/DL (ref 0.55–1.02)
DIFFERENTIAL METHOD BLD: ABNORMAL
EOSINOPHIL # BLD: 0.48 K/UL (ref 0–0.4)
EOSINOPHIL NFR BLD: 5.3 % (ref 0–7)
EPITH CASTS URNS QL MICRO: ABNORMAL /LPF
ERYTHROCYTE [DISTWIDTH] IN BLOOD BY AUTOMATED COUNT: 13.3 % (ref 11.5–14.5)
GLOBULIN SER CALC-MCNC: 4.4 G/DL (ref 2–4)
GLUCOSE SERPL-MCNC: 104 MG/DL (ref 65–100)
GLUCOSE UR STRIP.AUTO-MCNC: NEGATIVE MG/DL
HCT VFR BLD AUTO: 42.9 % (ref 35–47)
HGB BLD-MCNC: 13.6 G/DL (ref 11.5–16)
HGB UR QL STRIP: NEGATIVE
IMM GRANULOCYTES # BLD AUTO: 0.07 K/UL (ref 0–0.04)
IMM GRANULOCYTES NFR BLD AUTO: 0.8 % (ref 0–0.5)
KETONES UR QL STRIP.AUTO: NEGATIVE MG/DL
LEUKOCYTE ESTERASE UR QL STRIP.AUTO: ABNORMAL
LYMPHOCYTES # BLD: 1.51 K/UL (ref 0.8–3.5)
LYMPHOCYTES NFR BLD: 16.5 % (ref 12–49)
MCH RBC QN AUTO: 27.5 PG (ref 26–34)
MCHC RBC AUTO-ENTMCNC: 31.7 G/DL (ref 30–36.5)
MCV RBC AUTO: 86.7 FL (ref 80–99)
MONOCYTES # BLD: 0.57 K/UL (ref 0–1)
MONOCYTES NFR BLD: 6.2 % (ref 5–13)
NEUTS SEG # BLD: 6.44 K/UL (ref 1.8–8)
NEUTS SEG NFR BLD: 70.5 % (ref 32–75)
NITRITE UR QL STRIP.AUTO: NEGATIVE
NRBC # BLD: 0 K/UL (ref 0–0.01)
NRBC BLD-RTO: 0 PER 100 WBC
PH UR STRIP: 6 (ref 5–8)
PLATELET # BLD AUTO: 226 K/UL (ref 150–400)
PMV BLD AUTO: 11.3 FL (ref 8.9–12.9)
POTASSIUM SERPL-SCNC: 3.9 MMOL/L (ref 3.5–5.1)
PROT SERPL-MCNC: 8 G/DL (ref 6.4–8.2)
PROT UR STRIP-MCNC: NEGATIVE MG/DL
RBC # BLD AUTO: 4.95 M/UL (ref 3.8–5.2)
RBC #/AREA URNS HPF: ABNORMAL /HPF (ref 0–5)
SODIUM SERPL-SCNC: 136 MMOL/L (ref 136–145)
SP GR UR REFRACTOMETRY: 1.01 (ref 1–1.03)
SPECIMEN HOLD: NORMAL
UROBILINOGEN UR QL STRIP.AUTO: 0.2 EU/DL (ref 0.2–1)
WBC # BLD AUTO: 9.1 K/UL (ref 3.6–11)
WBC URNS QL MICRO: ABNORMAL /HPF (ref 0–4)

## 2025-01-29 PROCEDURE — 36415 COLL VENOUS BLD VENIPUNCTURE: CPT

## 2025-01-29 PROCEDURE — 4500000002 HC ER NO CHARGE

## 2025-01-29 PROCEDURE — 81001 URINALYSIS AUTO W/SCOPE: CPT

## 2025-01-29 PROCEDURE — 99282 EMERGENCY DEPT VISIT SF MDM: CPT

## 2025-01-29 PROCEDURE — 80053 COMPREHEN METABOLIC PANEL: CPT

## 2025-01-29 PROCEDURE — 99283 EMERGENCY DEPT VISIT LOW MDM: CPT

## 2025-01-29 PROCEDURE — 85025 COMPLETE CBC W/AUTO DIFF WBC: CPT

## 2025-01-29 ASSESSMENT — PAIN - FUNCTIONAL ASSESSMENT: PAIN_FUNCTIONAL_ASSESSMENT: NONE - DENIES PAIN

## 2025-01-29 NOTE — ED NOTES
10:21 AM  I have evaluated the patient as the Provider in Rapid Medical Evaluation (RME). I have reviewed her vital signs and the triage nurse assessment. I have talked with the patient and any available family and advised that I am the provider in triage and have ordered the appropriate study to initiate their work up based on the clinical presentation during my assessment. I have advised that the patient will be accommodated in the Main ED as soon as possible. I have also requested to contact the triage nurse or myself immediately if the patient experiences any changes in their condition during this brief waiting period.  YESENIA Rodriguez    Jennifer Nassar is a 39 y.o. female who presents to the emergency department complaining of elevated blood pressure, nausea, vomiting, headaches.  Patient reports having severe preeclampsia throughout her pregnancy.  Patient reports having multiple bouts of nausea and vomiting at home.  She reports being on antibiotics for her  and finished them approximately 3 days ago.  She reports being on magnesium after her birth but stopped it due to her blood pressure becoming too low.  She denies being on any blood pressure medications during her pregnancy or now.  She reports having diffuse headache but denies any vision issues.  She also reports dark urine but she is unsure if this to dehydration.  She denies any vaginal bleeding.  She denies breast-feeding.             Mary Zelaya PA  [phone]

## 2025-01-29 NOTE — ED PROVIDER NOTES
OBHospitalist Note:    Subjective:     Jennifer Nassar is a 39 y.o.  Postpartum 5 weeks . female who is being seen for elevated BP at [facility]. She was there for GI symptoms, but they noted mildly elevated Blood Pressure 138/90 w/o headache, scotomata or RUQ pain. Nonetheless they recommend she come to Hospital for evaluation given her h/o preeclampsia 1 month ago . She presented to our [facility] ED then was sent up for our evaluation in [place].    OB/GYN ROS: H/o GDM and Preeclampsia and   OB/GYN history: obstetric history: ( : 1, Para: 1, Misc/Ab: 0)    Patient Active Problem List    Diagnosis Date Noted    Pre-eclampsia affecting pregnancy, antepartum 2024     Past Medical History:   Diagnosis Date    Anxiety     Gestational diabetes     diet-controlled    Gestational diabetes mellitus 10/30    Pre-eclampsia       Past Surgical History:   Procedure Laterality Date    APPENDECTOMY       SECTION N/A 2024     SECTION performed by Natalie Zavala MD at [facility] L[facility] OR      Social History     Tobacco Use    Smoking status: Former     Current packs/day: 0.50     Average packs/day: 0.5 packs/day for 10.0 years (5.0 ttl pk-yrs)     Types: Cigarettes    Smokeless tobacco: Never   Substance Use Topics    Alcohol use: Not Currently      Family History   Problem Relation Age of Onset    Diabetes Father       Prior to Admission Medications   Prescriptions Last Dose Informant Patient Reported? Taking?   Prenatal Vit w/Vw-Didxdcnhl-DA (PNV PO) 2025  Yes Yes   Sig: Take by mouth   docusate sodium (COLACE, DULCOLAX) 100 MG CAPS Past Week  No Yes   Sig: Take 100 mg by mouth 2 times daily   escitalopram (LEXAPRO) 10 MG tablet 2025  Yes Yes   Sig: Take 1 tablet by mouth daily   ibuprofen (ADVIL;MOTRIN) 800 MG tablet 2025  No Yes   Sig: Take 1 tablet by mouth in the morning and 1 tablet at noon and 1 tablet in the evening.      Facility-Administered Medications: None     No Known

## 2025-01-29 NOTE — ED TRIAGE NOTES
Patient had severe pre-eclampia with delivery of daughter on 24. Patient started with \"bubble in chest\" on . Denies fever. + chills. Vomited x 2 since . Recent antibiotics for  incision site. Patient was on magnesium after birth. Denies any blood pressure medicine at home. + headache, no vision issues or swelling in legs. + dark urine. + abdominal pain. Denies vaginal bleeding.

## 2025-01-29 NOTE — PROGRESS NOTES
1100- pt arrived with complaints of higher Bps at urgent care and they sent her there. History of C section for pre-e at 37 weeks on 12/27/24. BP at urgent care 133/92. Pt here endorses no headache or vision changes. Has has some N/V and heartburn.     1111- Dr Travis at bedside plan to monitor BP and await labs    1215-spoke with Dr Travis about elevated liver enzymes. Plan for pt to follow up with pcp and or OB. Okay to dc    1218-pt verbalizes understanding dc'd with all belongings.

## 2025-01-29 NOTE — ED NOTES
Talked to Jessica on Labor and Delivery and stated to sent patient to Labor and Delivery. Charge nurse notified and Dr. Garcia notified.

## 2025-02-07 NOTE — ED PROVIDER NOTES
worsen      DISCHARGE MEDICATIONS:  Discharge Medication List as of 1/29/2025 10:54 AM            (Please note that portions of this note were completed with a voice recognition program.  Efforts were made to edit the dictations but occasionally words are mis-transcribed.)    Yung Garcia MD (electronically signed)  Emergency Attending Physician / Physician Assistant / Nurse Practitioner      Total critical care time (not including time spent performing separately reportable procedures): 44 min.         Yung Garcia MD  02/06/25 2009

## (undated) DEVICE — LIGHT HANDLE: Brand: DEVON

## (undated) DEVICE — CATHETER URIN 16FR 30CC BLLN 2 W F LUBRI-SIL IC

## (undated) DEVICE — APPLICATOR MEDICATED 26 CC SOLUTION HI LT ORNG CHLORAPREP

## (undated) DEVICE — ATTACHMENT SMK 3/8INX10FT VALLEYLAB

## (undated) DEVICE — COVERALLS PROTCT 2XL WHT SMS ANTISTATIC PREM KNIT CUF FULL

## (undated) DEVICE — DEVON™ KNEE AND BODY STRAP 60" X 3" (1.5 M X 7.6 CM): Brand: DEVON

## (undated) DEVICE — STERILE POLYISOPRENE POWDER-FREE SURGICAL GLOVES: Brand: PROTEXIS

## (undated) DEVICE — SOLUTION IRRIG 1000ML 0.9% SOD CHL USP POUR PLAS BTL

## (undated) DEVICE — SUTURE STRATAFIX SYMMETRIC SZ 1 L18IN ABSRB VLT CT1 L36CM SXPP1A404

## (undated) DEVICE — SOLIDIFIER FLUID 1.3 OZ GEL 1200CC NS

## (undated) DEVICE — ELECTRODE PT RET AD L9FT HI MOIST COND ADH HYDRGEL CORDED

## (undated) DEVICE — Z DISCONTINUED PACK PROCEDURE SURG C SECT KT SMH

## (undated) DEVICE — DRAPE FLD WRM W44XL66IN C6L FOR INTRATEMP SYS THERMABASIN

## (undated) DEVICE — 3000CC GUARDIAN II: Brand: GUARDIAN

## (undated) DEVICE — ROYALSILK SURGICAL GOWN, L: Brand: CONVERTORS

## (undated) DEVICE — STAPLER SKIN SQ 30 ABSRB STPL DISP INSORB ORDER VIA PHONE OR EMAIL

## (undated) DEVICE — DRESSING SIL W4XL5IN ANTIBACT GELLING FBR CYTOFORM

## (undated) DEVICE — SUTURE VICRYL SZ 0 L36IN ABSRB UD L40MM CT 1/2 CIR TAPERPOINT J958H

## (undated) DEVICE — MEDI-VAC NON-CONDUCTIVE SUCTION TUBING: Brand: CARDINAL HEALTH

## (undated) DEVICE — KENDALL SCD EXPRESS SLEEVES, KNEE LENGTH, MEDIUM: Brand: KENDALL SCD